# Patient Record
Sex: MALE | Race: WHITE | NOT HISPANIC OR LATINO | Employment: STUDENT | URBAN - METROPOLITAN AREA
[De-identification: names, ages, dates, MRNs, and addresses within clinical notes are randomized per-mention and may not be internally consistent; named-entity substitution may affect disease eponyms.]

---

## 2021-11-04 ENCOUNTER — HOSPITAL ENCOUNTER (EMERGENCY)
Facility: HOSPITAL | Age: 21
Discharge: HOME | End: 2021-11-04
Attending: EMERGENCY MEDICINE
Payer: COMMERCIAL

## 2021-11-04 ENCOUNTER — APPOINTMENT (EMERGENCY)
Dept: CT IMAGING | Facility: HOSPITAL | Age: 21
End: 2021-11-04
Payer: COMMERCIAL

## 2021-11-04 VITALS
WEIGHT: 183 LBS | HEART RATE: 100 BPM | DIASTOLIC BLOOD PRESSURE: 74 MMHG | SYSTOLIC BLOOD PRESSURE: 116 MMHG | RESPIRATION RATE: 16 BRPM | TEMPERATURE: 98.4 F | OXYGEN SATURATION: 100 %

## 2021-11-04 DIAGNOSIS — S09.90XA INJURY OF HEAD, INITIAL ENCOUNTER: ICD-10-CM

## 2021-11-04 DIAGNOSIS — R55 VASOVAGAL SYNCOPE: Primary | ICD-10-CM

## 2021-11-04 LAB
ANION GAP SERPL CALCULATED.3IONS-SCNC: 10 MMOL/L (ref 4–13)
APTT PPP: 33 SECONDS (ref 23–37)
ATRIAL RATE: 81 BPM
BASOPHILS # BLD AUTO: 0.08 THOUSANDS/ΜL (ref 0–0.1)
BASOPHILS NFR BLD AUTO: 1 % (ref 0–1)
BUN SERPL-MCNC: 10 MG/DL (ref 5–25)
CALCIUM SERPL-MCNC: 9.2 MG/DL (ref 8.3–10.1)
CHLORIDE SERPL-SCNC: 102 MMOL/L (ref 100–108)
CO2 SERPL-SCNC: 26 MMOL/L (ref 21–32)
CREAT SERPL-MCNC: 0.76 MG/DL (ref 0.6–1.3)
EOSINOPHIL # BLD AUTO: 0.19 THOUSAND/ΜL (ref 0–0.61)
EOSINOPHIL NFR BLD AUTO: 2 % (ref 0–6)
ERYTHROCYTE [DISTWIDTH] IN BLOOD BY AUTOMATED COUNT: 13.9 % (ref 11.6–15.1)
GFR SERPL CREATININE-BSD FRML MDRD: 131 ML/MIN/1.73SQ M
GLUCOSE SERPL-MCNC: 93 MG/DL (ref 65–140)
HCT VFR BLD AUTO: 47.3 % (ref 36.5–49.3)
HGB BLD-MCNC: 15.8 G/DL (ref 12–17)
IMM GRANULOCYTES # BLD AUTO: 0.06 THOUSAND/UL (ref 0–0.2)
IMM GRANULOCYTES NFR BLD AUTO: 1 % (ref 0–2)
INR PPP: 1.16 (ref 0.84–1.19)
LYMPHOCYTES # BLD AUTO: 2.72 THOUSANDS/ΜL (ref 0.6–4.47)
LYMPHOCYTES NFR BLD AUTO: 24 % (ref 14–44)
MCH RBC QN AUTO: 28.4 PG (ref 26.8–34.3)
MCHC RBC AUTO-ENTMCNC: 33.4 G/DL (ref 31.4–37.4)
MCV RBC AUTO: 85 FL (ref 82–98)
MONOCYTES # BLD AUTO: 0.73 THOUSAND/ΜL (ref 0.17–1.22)
MONOCYTES NFR BLD AUTO: 6 % (ref 4–12)
NEUTROPHILS # BLD AUTO: 7.73 THOUSANDS/ΜL (ref 1.85–7.62)
NEUTS SEG NFR BLD AUTO: 66 % (ref 43–75)
NRBC BLD AUTO-RTO: 0 /100 WBCS
P AXIS: 67 DEGREES
PLATELET # BLD AUTO: 365 THOUSANDS/UL (ref 149–390)
PMV BLD AUTO: 10.7 FL (ref 8.9–12.7)
POTASSIUM SERPL-SCNC: 3.9 MMOL/L (ref 3.5–5.3)
PR INTERVAL: 148 MS
PROTHROMBIN TIME: 14.4 SECONDS (ref 11.6–14.5)
QRS AXIS: 50 DEGREES
QRSD INTERVAL: 92 MS
QT INTERVAL: 354 MS
QTC INTERVAL: 411 MS
RBC # BLD AUTO: 5.56 MILLION/UL (ref 3.88–5.62)
SODIUM SERPL-SCNC: 138 MMOL/L (ref 136–145)
T WAVE AXIS: 54 DEGREES
TROPONIN I SERPL-MCNC: <0.02 NG/ML
VENTRICULAR RATE: 81 BPM
WBC # BLD AUTO: 11.51 THOUSAND/UL (ref 4.31–10.16)

## 2021-11-04 PROCEDURE — 70450 CT HEAD/BRAIN W/O DYE: CPT

## 2021-11-04 PROCEDURE — 85730 THROMBOPLASTIN TIME PARTIAL: CPT | Performed by: EMERGENCY MEDICINE

## 2021-11-04 PROCEDURE — 85610 PROTHROMBIN TIME: CPT | Performed by: EMERGENCY MEDICINE

## 2021-11-04 PROCEDURE — 36415 COLL VENOUS BLD VENIPUNCTURE: CPT | Performed by: EMERGENCY MEDICINE

## 2021-11-04 PROCEDURE — 80048 BASIC METABOLIC PNL TOTAL CA: CPT | Performed by: EMERGENCY MEDICINE

## 2021-11-04 PROCEDURE — 71250 CT THORAX DX C-: CPT

## 2021-11-04 PROCEDURE — 99285 EMERGENCY DEPT VISIT HI MDM: CPT | Performed by: EMERGENCY MEDICINE

## 2021-11-04 PROCEDURE — 84484 ASSAY OF TROPONIN QUANT: CPT | Performed by: EMERGENCY MEDICINE

## 2021-11-04 PROCEDURE — 96360 HYDRATION IV INFUSION INIT: CPT

## 2021-11-04 PROCEDURE — 93010 ELECTROCARDIOGRAM REPORT: CPT | Performed by: INTERNAL MEDICINE

## 2021-11-04 PROCEDURE — 96361 HYDRATE IV INFUSION ADD-ON: CPT

## 2021-11-04 PROCEDURE — 93005 ELECTROCARDIOGRAM TRACING: CPT

## 2021-11-04 PROCEDURE — 85025 COMPLETE CBC W/AUTO DIFF WBC: CPT | Performed by: EMERGENCY MEDICINE

## 2021-11-04 PROCEDURE — 99285 EMERGENCY DEPT VISIT HI MDM: CPT

## 2021-11-04 RX ADMIN — SODIUM CHLORIDE 1000 ML: 0.9 INJECTION, SOLUTION INTRAVENOUS at 19:49

## 2023-04-21 ENCOUNTER — HOSPITAL ENCOUNTER (EMERGENCY)
Facility: HOSPITAL | Age: 23
Discharge: HOME | End: 2023-04-21
Attending: EMERGENCY MEDICINE

## 2023-04-21 VITALS
OXYGEN SATURATION: 98 % | TEMPERATURE: 99 F | WEIGHT: 227.07 LBS | DIASTOLIC BLOOD PRESSURE: 66 MMHG | SYSTOLIC BLOOD PRESSURE: 137 MMHG | RESPIRATION RATE: 16 BRPM | HEART RATE: 88 BPM

## 2023-04-21 DIAGNOSIS — F41.9 ANXIETY: Primary | ICD-10-CM

## 2023-04-21 LAB
ALBUMIN SERPL BCP-MCNC: 4 G/DL (ref 3.5–5)
ALP SERPL-CCNC: 101 U/L (ref 34–104)
ALT SERPL W P-5'-P-CCNC: 36 U/L (ref 7–52)
ANION GAP SERPL CALCULATED.3IONS-SCNC: 5 MMOL/L (ref 4–13)
AST SERPL W P-5'-P-CCNC: 24 U/L (ref 13–39)
BASOPHILS # BLD AUTO: 0.11 THOUSANDS/ΜL (ref 0–0.1)
BASOPHILS NFR BLD AUTO: 1 % (ref 0–1)
BILIRUB SERPL-MCNC: 0.38 MG/DL (ref 0.2–1)
BILIRUB UR QL STRIP: NEGATIVE
BUN SERPL-MCNC: 12 MG/DL (ref 5–25)
CALCIUM SERPL-MCNC: 8.7 MG/DL (ref 8.4–10.2)
CHLORIDE SERPL-SCNC: 106 MMOL/L (ref 96–108)
CLARITY UR: CLEAR
CO2 SERPL-SCNC: 28 MMOL/L (ref 21–32)
COLOR UR: YELLOW
CREAT SERPL-MCNC: 0.8 MG/DL (ref 0.6–1.3)
EOSINOPHIL # BLD AUTO: 0.22 THOUSAND/ΜL (ref 0–0.61)
EOSINOPHIL NFR BLD AUTO: 1 % (ref 0–6)
ERYTHROCYTE [DISTWIDTH] IN BLOOD BY AUTOMATED COUNT: 13 % (ref 11.6–15.1)
GFR SERPL CREATININE-BSD FRML MDRD: 126 ML/MIN/1.73SQ M
GLUCOSE SERPL-MCNC: 95 MG/DL (ref 65–140)
GLUCOSE UR STRIP-MCNC: NEGATIVE MG/DL
HCT VFR BLD AUTO: 43.8 % (ref 36.5–49.3)
HGB BLD-MCNC: 14.7 G/DL (ref 12–17)
HGB UR QL STRIP.AUTO: NEGATIVE
IMM GRANULOCYTES # BLD AUTO: 0.15 THOUSAND/UL (ref 0–0.2)
IMM GRANULOCYTES NFR BLD AUTO: 1 % (ref 0–2)
KETONES UR STRIP-MCNC: NEGATIVE MG/DL
LEUKOCYTE ESTERASE UR QL STRIP: NEGATIVE
LITHIUM SERPL-SCNC: 0.7 MMOL/L (ref 0.6–1.2)
LYMPHOCYTES # BLD AUTO: 2.05 THOUSANDS/ΜL (ref 0.6–4.47)
LYMPHOCYTES NFR BLD AUTO: 14 % (ref 14–44)
MAGNESIUM SERPL-MCNC: 2.1 MG/DL (ref 1.9–2.7)
MCH RBC QN AUTO: 29.5 PG (ref 26.8–34.3)
MCHC RBC AUTO-ENTMCNC: 33.6 G/DL (ref 31.4–37.4)
MCV RBC AUTO: 88 FL (ref 82–98)
MONOCYTES # BLD AUTO: 0.98 THOUSAND/ΜL (ref 0.17–1.22)
MONOCYTES NFR BLD AUTO: 6 % (ref 4–12)
NEUTROPHILS # BLD AUTO: 11.69 THOUSANDS/ΜL (ref 1.85–7.62)
NEUTS SEG NFR BLD AUTO: 77 % (ref 43–75)
NITRITE UR QL STRIP: NEGATIVE
NRBC BLD AUTO-RTO: 0 /100 WBCS
PH UR STRIP.AUTO: 7 [PH] (ref 4.5–8)
PHOSPHATE SERPL-MCNC: 4.5 MG/DL (ref 2.7–4.5)
PLATELET # BLD AUTO: 271 THOUSANDS/UL (ref 149–390)
PMV BLD AUTO: 9.7 FL (ref 8.9–12.7)
POTASSIUM SERPL-SCNC: 4.1 MMOL/L (ref 3.5–5.3)
PROT SERPL-MCNC: 6.2 G/DL (ref 6.4–8.4)
PROT UR STRIP-MCNC: NEGATIVE MG/DL
RBC # BLD AUTO: 4.99 MILLION/UL (ref 3.88–5.62)
SODIUM SERPL-SCNC: 139 MMOL/L (ref 135–147)
SP GR UR STRIP.AUTO: 1.02 (ref 1–1.03)
UROBILINOGEN UR QL STRIP.AUTO: 0.2 E.U./DL
WBC # BLD AUTO: 15.2 THOUSAND/UL (ref 4.31–10.16)

## 2023-04-21 RX ADMIN — SODIUM CHLORIDE 1000 ML: 0.9 INJECTION, SOLUTION INTRAVENOUS at 14:26

## 2023-04-21 NOTE — ED PROVIDER NOTES
Pt Name: Kathryne Eisenmenger  MRN: [id number]  [place] 2000  Age/Sex: 26 y o  male  Date of evaluation: 4/21/2023  PCP: No primary care provider on file  CHIEF COMPLAINT    Chief Complaint   Patient presents with   • Medication Problem     Pt reports concern for Lithium toxicity; reports feeling dehydrated and 'out of it'  PCP would like STAT lithium levels ordered  Pt has been on medication for roughly 2 months with no increase in dosage  HPI    Luz Thornton presents to the Emergency Department complaining of feeling unwell  He thinks that he is dehydrated and is concerned that his lithium level may be too high  He has been compliant with prescribed meds  He spoke with his psychiatrist and she told him to come to the ER to be evaluated  HPI      Past Medical and Surgical History    Past Medical History:   Diagnosis Date   • Anxiety        Past Surgical History:   Procedure Laterality Date   • GASTROPLASTY DUODENAL SWITCH         History reviewed  No pertinent family history  Social History     Tobacco Use   • Smoking status: Never   • Smokeless tobacco: Never   Substance Use Topics   • Alcohol use: Not Currently   • Drug use: Not Currently           Allergies    No Known Allergies    Home Medications    Prior to Admission medications    Not on File           Review of Systems    Review of Systems   Constitutional: Positive for fatigue  Negative for chills and fever  HENT: Negative for ear pain and sore throat  Eyes: Negative for pain and visual disturbance  Respiratory: Negative for cough and shortness of breath  Cardiovascular: Negative for chest pain and palpitations  Gastrointestinal: Negative for abdominal pain and vomiting  Genitourinary: Negative for dysuria and hematuria  Musculoskeletal: Negative for arthralgias and back pain  Skin: Negative for color change and rash  Neurological: Negative for seizures and syncope     All other systems reviewed and are negative  Physical Exam      ED Triage Vitals [04/21/23 1246]   Temperature Pulse Respirations Blood Pressure SpO2   99 °F (37 2 °C) 89 18 137/66 97 %      Temp Source Heart Rate Source Patient Position - Orthostatic VS BP Location FiO2 (%)   Oral Monitor Sitting Right arm --      Pain Score       --               Physical Exam  Vitals and nursing note reviewed  Constitutional:       General: He is not in acute distress  Appearance: He is well-developed  He is not diaphoretic  HENT:      Head: Normocephalic and atraumatic  Nose: Nose normal    Eyes:      Conjunctiva/sclera: Conjunctivae normal       Pupils: Pupils are equal, round, and reactive to light  Cardiovascular:      Rate and Rhythm: Normal rate and regular rhythm  Heart sounds: Normal heart sounds  No murmur heard  No friction rub  No gallop  Pulmonary:      Effort: Pulmonary effort is normal  No respiratory distress  Breath sounds: Normal breath sounds  No wheezing or rales  Abdominal:      General: Bowel sounds are normal       Palpations: Abdomen is soft  Tenderness: There is no abdominal tenderness  There is no guarding or rebound  Musculoskeletal:         General: Normal range of motion  Cervical back: Normal range of motion and neck supple  Skin:     General: Skin is warm and dry  Neurological:      Mental Status: He is alert and oriented to person, place, and time  Psychiatric:         Behavior: Behavior normal          Assessment and Plan    Jordyn Pa is a 26 y o  male who presents with thirst and fatigue  Physical examination unremarkable  Plan will be to perform diagnostic testing and treat symptomatically        MDM    Diagnostic Results      Labs:    Results for orders placed or performed during the hospital encounter of 04/21/23   CBC and differential   Result Value Ref Range    WBC 15 20 (H) 4 31 - 10 16 Thousand/uL    RBC 4 99 3 88 - 5 62 Million/uL    Hemoglobin 14 7 12 0 - 17 0 g/dL    Hematocrit 43 8 36 5 - 49 3 %    MCV 88 82 - 98 fL    MCH 29 5 26 8 - 34 3 pg    MCHC 33 6 31 4 - 37 4 g/dL    RDW 13 0 11 6 - 15 1 %    MPV 9 7 8 9 - 12 7 fL    Platelets 199 125 - 189 Thousands/uL    nRBC 0 /100 WBCs    Neutrophils Relative 77 (H) 43 - 75 %    Immat GRANS % 1 0 - 2 %    Lymphocytes Relative 14 14 - 44 %    Monocytes Relative 6 4 - 12 %    Eosinophils Relative 1 0 - 6 %    Basophils Relative 1 0 - 1 %    Neutrophils Absolute 11 69 (H) 1 85 - 7 62 Thousands/µL    Immature Grans Absolute 0 15 0 00 - 0 20 Thousand/uL    Lymphocytes Absolute 2 05 0 60 - 4 47 Thousands/µL    Monocytes Absolute 0 98 0 17 - 1 22 Thousand/µL    Eosinophils Absolute 0 22 0 00 - 0 61 Thousand/µL    Basophils Absolute 0 11 (H) 0 00 - 0 10 Thousands/µL   Comprehensive metabolic panel   Result Value Ref Range    Sodium 139 135 - 147 mmol/L    Potassium 4 1 3 5 - 5 3 mmol/L    Chloride 106 96 - 108 mmol/L    CO2 28 21 - 32 mmol/L    ANION GAP 5 4 - 13 mmol/L    BUN 12 5 - 25 mg/dL    Creatinine 0 80 0 60 - 1 30 mg/dL    Glucose 95 65 - 140 mg/dL    Calcium 8 7 8 4 - 10 2 mg/dL    AST 24 13 - 39 U/L    ALT 36 7 - 52 U/L    Alkaline Phosphatase 101 34 - 104 U/L    Total Protein 6 2 (L) 6 4 - 8 4 g/dL    Albumin 4 0 3 5 - 5 0 g/dL    Total Bilirubin 0 38 0 20 - 1 00 mg/dL    eGFR 126 ml/min/1 73sq m   Lithium level   Result Value Ref Range    Lithium Lvl 0 7 0 6 - 1 2 mmol/L   Magnesium   Result Value Ref Range    Magnesium 2 1 1 9 - 2 7 mg/dL   Phosphorus   Result Value Ref Range    Phosphorus 4 5 2 7 - 4 5 mg/dL   Urine Macroscopic, POC   Result Value Ref Range    Color, UA Yellow     Clarity, UA Clear     pH, UA 7 0 4 5 - 8 0    Leukocytes, UA Negative Negative    Nitrite, UA Negative Negative    Protein, UA Negative Negative mg/dl    Glucose, UA Negative Negative mg/dl    Ketones, UA Negative Negative mg/dl    Urobilinogen, UA 0 2 0 2, 1 0 E U /dl E U /dl    Bilirubin, UA Negative Negative    Occult Blood, UA Negative Negative    Specific [place], UA 1 020 1 003 - 1 030       All labs reviewed and utilized in the medical decision making process    Radiology:    No orders to display       All radiology studies independently viewed by me and interpreted by the radiologist     Procedure    Procedures      ED Course of Care and Re-Assessments        Medications   sodium chloride 0 9 % bolus 1,000 mL (0 mL Intravenous Stopped 4/21/23 8475)           FINAL IMPRESSION    Final diagnoses:   Anxiety         DISPOSITION/PLAN    Time reflects when diagnosis was documented in both MDM as applicable and the Disposition within this note     Time User Action Codes Description Comment    4/21/2023  3:21 PM Moncho Frias Add [F41 9] Anxiety       ED Disposition     ED Disposition   Discharge    Condition   Stable    Date/Time   Fri Apr 21, 2023  3:21 PM    Comment   Mervat Fox discharge to home/self care  Follow-up Information    None           PATIENT REFERRED TO:    No follow-up provider specified  DISCHARGE MEDICATIONS:    There are no discharge medications for this patient  No discharge procedures on file           Jet Aguilar, [address] [place] [address],   04/21/23 2032

## 2023-04-21 NOTE — ED NOTES
Pt approached Registration to reports he now is feeling palpitations  Reassessment of VS in [address] completed        Chi Garcias RN  04/21/23 1502

## 2023-09-06 ENCOUNTER — HOSPITAL ENCOUNTER (EMERGENCY)
Facility: HOSPITAL | Age: 23
Discharge: HOME | End: 2023-09-07
Attending: EMERGENCY MEDICINE
Payer: COMMERCIAL

## 2023-09-06 ENCOUNTER — APPOINTMENT (EMERGENCY)
Dept: RADIOLOGY | Facility: HOSPITAL | Age: 23
End: 2023-09-06
Payer: COMMERCIAL

## 2023-09-06 VITALS
RESPIRATION RATE: 18 BRPM | SYSTOLIC BLOOD PRESSURE: 142 MMHG | HEART RATE: 94 BPM | OXYGEN SATURATION: 100 % | WEIGHT: 210 LBS | TEMPERATURE: 98.4 F | DIASTOLIC BLOOD PRESSURE: 67 MMHG

## 2023-09-06 DIAGNOSIS — S93.601A SPRAIN OF RIGHT FOOT, INITIAL ENCOUNTER: Primary | ICD-10-CM

## 2023-09-06 PROCEDURE — 99284 EMERGENCY DEPT VISIT MOD MDM: CPT | Performed by: EMERGENCY MEDICINE

## 2023-09-06 PROCEDURE — 73610 X-RAY EXAM OF ANKLE: CPT

## 2023-09-06 PROCEDURE — 73630 X-RAY EXAM OF FOOT: CPT

## 2023-09-06 PROCEDURE — 99283 EMERGENCY DEPT VISIT LOW MDM: CPT

## 2023-09-06 PROCEDURE — 96372 THER/PROPH/DIAG INJ SC/IM: CPT

## 2023-09-06 RX ORDER — KETOROLAC TROMETHAMINE 30 MG/ML
30 INJECTION, SOLUTION INTRAMUSCULAR; INTRAVENOUS ONCE
Status: COMPLETED | OUTPATIENT
Start: 2023-09-06 | End: 2023-09-06

## 2023-09-06 RX ADMIN — KETOROLAC TROMETHAMINE 30 MG: 30 INJECTION, SOLUTION INTRAMUSCULAR; INTRAVENOUS at 23:28

## 2023-09-06 NOTE — Clinical Note
Josesito Salmeron was seen and treated in our emergency department on 9/6/2023. No restrictions            Diagnosis:     Angeles Cousin  may return to school on return date. He may return on this date: 09/08/2023         If you have any questions or concerns, please don't hesitate to call.       Andre Davenport MD    ______________________________           _______________          _______________  Hospital Representative                              Date                                Time

## 2023-09-07 ENCOUNTER — OFFICE VISIT (OUTPATIENT)
Dept: PODIATRY | Facility: CLINIC | Age: 23
End: 2023-09-07
Payer: COMMERCIAL

## 2023-09-07 VITALS — WEIGHT: 210 LBS | HEIGHT: 72 IN | BODY MASS INDEX: 28.44 KG/M2

## 2023-09-07 DIAGNOSIS — M79.671 PAIN IN RIGHT FOOT: ICD-10-CM

## 2023-09-07 DIAGNOSIS — M77.9 TENDONITIS: ICD-10-CM

## 2023-09-07 DIAGNOSIS — S90.31XA CONTUSION OF RIGHT FOOT, INITIAL ENCOUNTER: Primary | ICD-10-CM

## 2023-09-07 PROCEDURE — 99204 OFFICE O/P NEW MOD 45 MIN: CPT | Performed by: PODIATRIST

## 2023-09-07 RX ORDER — QUETIAPINE 200 MG/1
200 TABLET, FILM COATED, EXTENDED RELEASE ORAL
COMMUNITY
Start: 2023-08-22

## 2023-09-07 RX ORDER — PANTOPRAZOLE SODIUM 20 MG/1
20 TABLET, DELAYED RELEASE ORAL DAILY
Qty: 4 TABLET | Refills: 0 | Status: SHIPPED | OUTPATIENT
Start: 2023-09-07 | End: 2023-09-16

## 2023-09-07 RX ORDER — PANTOPRAZOLE SODIUM 40 MG/1
40 TABLET, DELAYED RELEASE ORAL DAILY
COMMUNITY
Start: 2023-07-13

## 2023-09-07 RX ORDER — MELOXICAM 15 MG/1
15 TABLET ORAL DAILY
Qty: 30 TABLET | Refills: 1 | Status: SHIPPED | OUTPATIENT
Start: 2023-09-07

## 2023-09-07 RX ORDER — TRIAMCINOLONE ACETONIDE 0.1 %
PASTE (GRAM) DENTAL
COMMUNITY
Start: 2023-08-07

## 2023-09-07 RX ORDER — LORAZEPAM 0.5 MG/1
TABLET ORAL
COMMUNITY
Start: 2023-07-09

## 2023-09-07 RX ORDER — FLUVOXAMINE MALEATE 100 MG
TABLET ORAL
COMMUNITY
Start: 2023-09-03

## 2023-09-07 RX ORDER — KETOROLAC TROMETHAMINE 10 MG/1
10 TABLET, FILM COATED ORAL EVERY 6 HOURS PRN
Qty: 8 TABLET | Refills: 0 | Status: SHIPPED | OUTPATIENT
Start: 2023-09-07

## 2023-09-07 RX ORDER — CHLORHEXIDINE GLUCONATE 0.12 MG/ML
RINSE ORAL
COMMUNITY
Start: 2023-08-07

## 2023-09-07 RX ORDER — LITHIUM CARBONATE 600 MG/1
600 CAPSULE ORAL 3 TIMES DAILY
COMMUNITY
Start: 2023-08-22 | End: 2023-09-16

## 2023-09-07 NOTE — DISCHARGE INSTRUCTIONS
Take the Toradol every 6 hours for 2 days. Take the Protonix daily while you take the Toradol. The number for podiatry is included in these discharge instructions, call to be seen in the office for further evaluation and management.
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
## 2023-09-07 NOTE — ED PROVIDER NOTES
History  Chief Complaint   Patient presents with   • Foot Injury     Pt c/o right foot pain after kicking someones thigh during a kickboxing class. 27 YO male presents with pain in the Right foot. Patient states he had kickboxing class ~4 hours ago. Had no issues with class but, after arriving home, developed pain in the lateral aspect of the Right foot which has been aching, sharp. Patient has had difficulty with ambulation due to the pain. He denies other injuries. Pt denies CP/SOB/F/C/N/V/D/C, no dysuria, burning on urination or blood in urine. History provided by:  Patient   used: No        None       Past Medical History:   Diagnosis Date   • Anxiety        Past Surgical History:   Procedure Laterality Date   • GASTROPLASTY DUODENAL SWITCH         History reviewed. No pertinent family history. I have reviewed and agree with the history as documented. E-Cigarette/Vaping     E-Cigarette/Vaping Substances     Social History     Tobacco Use   • Smoking status: Never   • Smokeless tobacco: Never   Substance Use Topics   • Alcohol use: Not Currently   • Drug use: Not Currently       Review of Systems   Constitutional: Negative for fever. HENT: Negative for dental problem. Eyes: Negative for visual disturbance. Respiratory: Negative for shortness of breath. Cardiovascular: Negative for chest pain. Gastrointestinal: Negative for abdominal pain, nausea and vomiting. Genitourinary: Negative for dysuria and frequency. Musculoskeletal: Positive for arthralgias. Negative for neck pain and neck stiffness. Skin: Negative for rash. Neurological: Negative for dizziness, weakness and light-headedness. Psychiatric/Behavioral: Negative for agitation, behavioral problems and confusion. All other systems reviewed and are negative. Physical Exam  Physical Exam  Vitals and nursing note reviewed. Constitutional:       Appearance: He is well-developed.    HENT:      Head: Normocephalic and atraumatic. Eyes:      Extraocular Movements: Extraocular movements intact. Cardiovascular:      Rate and Rhythm: Normal rate. Pulmonary:      Effort: Pulmonary effort is normal.   Abdominal:      General: There is no distension. Musculoskeletal:      Cervical back: Normal range of motion. Comments: Tender to palpation over the lateral aspect of the Right foot. Skin:     Findings: No rash. Neurological:      Mental Status: He is alert and oriented to person, place, and time. Psychiatric:         Behavior: Behavior normal.         Vital Signs  ED Triage Vitals   Temperature Pulse Respirations Blood Pressure SpO2   09/06/23 2319 09/06/23 2313 09/06/23 2313 09/06/23 2319 09/06/23 2313   98.4 °F (36.9 °C) 94 18 142/67 100 %      Temp src Heart Rate Source Patient Position - Orthostatic VS BP Location FiO2 (%)   -- -- -- -- --             Pain Score       09/06/23 2313       10 - Worst Possible Pain           Vitals:    09/06/23 2313 09/06/23 2319   BP:  142/67   Pulse: 94          Visual Acuity      ED Medications  Medications   ketorolac (TORADOL) injection 30 mg (30 mg Intramuscular Given 9/6/23 2328)       Diagnostic Studies  Results Reviewed     None                 XR ankle 3+ views RIGHT   ED Interpretation by Andre Davenport MD (09/06 2348)   No fracture      XR foot 3+ views RIGHT   ED Interpretation by Andre Davenport MD (09/06 2349)   No fracture                   Procedures  Procedures     Splint application:  Posterior Splint was applied to Right leg, Applied by technician, good position, neurovascular tendon intact, good capillary refill. Evaluated by me prior to discharge. ED Course  ED Course as of 09/07/23 0042   Wed Sep 06, 2023   2349 X-ray(s) personally evaluated, interpretation: No fractures or dislocation. Thu Sep 07, 2023   0014 Patient unable to bear weight on foot, will have splint placed.                                SBIRT 20yo+    Flowsheet Row Most Recent Value   Initial Alcohol Screen: US AUDIT-C     1. How often do you have a drink containing alcohol? 2 Filed at: 09/06/2023 2315   2. How many drinks containing alcohol do you have on a typical day you are drinking? 2 Filed at: 09/06/2023 2315   3a. Male UNDER 65: How often do you have five or more drinks on one occasion? 0 Filed at: 09/06/2023 2315   Audit-C Score 4 Filed at: 09/06/2023 2315   ROSA: How many times in the past year have you. .. Used an illegal drug or used a prescription medication for non-medical reasons? Never Filed at: 09/06/2023 2315                    Medical Decision Making  1. Foot pain - Patient states no pain initially, likely sprain. Will x-ray foot and ankle to rule out fracture. Likely splint as patient is unable to ambulate. Amount and/or Complexity of Data Reviewed  Radiology: ordered and independent interpretation performed. Decision-making details documented in ED Course. Risk  Prescription drug management. Disposition  Final diagnoses:   Sprain of right foot, initial encounter     Time reflects when diagnosis was documented in both MDM as applicable and the Disposition within this note     Time User Action Codes Description Comment    9/7/2023 12:15 AM Gonzalez, [address] Sprain of right foot, initial encounter       ED Disposition     ED Disposition   Discharge    Condition   Stable    Date/Time   Thu Sep 7, 2023 12:15 AM    Comment   Lashaun Mckinney discharge to home/self care.                Follow-up Information     Follow up With Specialties Details Why Contact Info Additional Information    [place] [id number] [address]  [facility] [address]  [address] [place] [address] [address], [address] [phone]          Patient's Medications   Discharge Prescriptions    KETOROLAC (TORADOL) 10 MG TABLET    Take 1 tablet (10 mg total) by mouth every 6 (six) hours as needed for moderate pain for up to 8 doses       Start Date: 9/7/2023  End Date: --       Order Dose: 10 mg       Quantity: 8 tablet    Refills: 0    PANTOPRAZOLE (PROTONIX) 20 MG TABLET    Take 1 tablet (20 mg total) by mouth daily for 4 doses       Start Date: 9/7/2023  End Date: 9/11/2023       Order Dose: 20 mg       Quantity: 4 tablet    Refills: 0       No discharge procedures on file.     PDMP Review     None          ED Provider  Electronically Signed by           Prakash Alonso MD  09/07/23 7099

## 2023-09-09 ENCOUNTER — APPOINTMENT (EMERGENCY)
Dept: CT IMAGING | Facility: HOSPITAL | Age: 23
End: 2023-09-09
Payer: COMMERCIAL

## 2023-09-09 ENCOUNTER — HOSPITAL ENCOUNTER (EMERGENCY)
Facility: HOSPITAL | Age: 23
Discharge: HOME | End: 2023-09-09
Attending: EMERGENCY MEDICINE
Payer: COMMERCIAL

## 2023-09-09 VITALS
RESPIRATION RATE: 16 BRPM | WEIGHT: 199.96 LBS | BODY MASS INDEX: 27.12 KG/M2 | DIASTOLIC BLOOD PRESSURE: 55 MMHG | OXYGEN SATURATION: 99 % | TEMPERATURE: 97.9 F | SYSTOLIC BLOOD PRESSURE: 117 MMHG | HEART RATE: 88 BPM

## 2023-09-09 DIAGNOSIS — R10.9 ABDOMINAL PAIN: ICD-10-CM

## 2023-09-09 DIAGNOSIS — R11.2 NAUSEA AND VOMITING: Primary | ICD-10-CM

## 2023-09-09 DIAGNOSIS — K52.9 CHRONIC DIARRHEA: ICD-10-CM

## 2023-09-09 LAB
ALBUMIN SERPL BCP-MCNC: 4.1 G/DL (ref 3.5–5)
ALP SERPL-CCNC: 124 U/L (ref 34–104)
ALT SERPL W P-5'-P-CCNC: 22 U/L (ref 7–52)
ANION GAP SERPL CALCULATED.3IONS-SCNC: 6 MMOL/L
AST SERPL W P-5'-P-CCNC: 20 U/L (ref 13–39)
BASOPHILS # BLD AUTO: 0.05 THOUSANDS/ÂΜL (ref 0–0.1)
BASOPHILS NFR BLD AUTO: 1 % (ref 0–1)
BILIRUB SERPL-MCNC: 0.4 MG/DL (ref 0.2–1)
BUN SERPL-MCNC: 7 MG/DL (ref 5–25)
CALCIUM SERPL-MCNC: 9.1 MG/DL (ref 8.4–10.2)
CHLORIDE SERPL-SCNC: 102 MMOL/L (ref 96–108)
CO2 SERPL-SCNC: 25 MMOL/L (ref 21–32)
CREAT SERPL-MCNC: 0.88 MG/DL (ref 0.6–1.3)
EOSINOPHIL # BLD AUTO: 0.21 THOUSAND/ÂΜL (ref 0–0.61)
EOSINOPHIL NFR BLD AUTO: 2 % (ref 0–6)
ERYTHROCYTE [DISTWIDTH] IN BLOOD BY AUTOMATED COUNT: 14.3 % (ref 11.6–15.1)
GFR SERPL CREATININE-BSD FRML MDRD: 121 ML/MIN/1.73SQ M
GLUCOSE SERPL-MCNC: 83 MG/DL (ref 65–140)
HCT VFR BLD AUTO: 50.2 % (ref 36.5–49.3)
HGB BLD-MCNC: 16.5 G/DL (ref 12–17)
IMM GRANULOCYTES # BLD AUTO: 0.07 THOUSAND/UL (ref 0–0.2)
IMM GRANULOCYTES NFR BLD AUTO: 1 % (ref 0–2)
LIPASE SERPL-CCNC: 49 U/L (ref 11–82)
LYMPHOCYTES # BLD AUTO: 1.2 THOUSANDS/ÂΜL (ref 0.6–4.47)
LYMPHOCYTES NFR BLD AUTO: 11 % (ref 14–44)
MCH RBC QN AUTO: 28.6 PG (ref 26.8–34.3)
MCHC RBC AUTO-ENTMCNC: 32.9 G/DL (ref 31.4–37.4)
MCV RBC AUTO: 87 FL (ref 82–98)
MONOCYTES # BLD AUTO: 0.72 THOUSAND/ÂΜL (ref 0.17–1.22)
MONOCYTES NFR BLD AUTO: 7 % (ref 4–12)
NEUTROPHILS # BLD AUTO: 8.56 THOUSANDS/ÂΜL (ref 1.85–7.62)
NEUTS SEG NFR BLD AUTO: 78 % (ref 43–75)
NRBC BLD AUTO-RTO: 0 /100 WBCS
PLATELET # BLD AUTO: 312 THOUSANDS/UL (ref 149–390)
PMV BLD AUTO: 10.7 FL (ref 8.9–12.7)
POTASSIUM SERPL-SCNC: 3.8 MMOL/L (ref 3.5–5.3)
PROT SERPL-MCNC: 6.7 G/DL (ref 6.4–8.4)
RBC # BLD AUTO: 5.76 MILLION/UL (ref 3.88–5.62)
SODIUM SERPL-SCNC: 133 MMOL/L (ref 135–147)
WBC # BLD AUTO: 10.81 THOUSAND/UL (ref 4.31–10.16)

## 2023-09-09 PROCEDURE — G1004 CDSM NDSC: HCPCS

## 2023-09-09 PROCEDURE — 74177 CT ABD & PELVIS W/CONTRAST: CPT

## 2023-09-09 PROCEDURE — 85025 COMPLETE CBC W/AUTO DIFF WBC: CPT | Performed by: EMERGENCY MEDICINE

## 2023-09-09 PROCEDURE — 96375 TX/PRO/DX INJ NEW DRUG ADDON: CPT

## 2023-09-09 PROCEDURE — 36415 COLL VENOUS BLD VENIPUNCTURE: CPT | Performed by: EMERGENCY MEDICINE

## 2023-09-09 PROCEDURE — 99284 EMERGENCY DEPT VISIT MOD MDM: CPT

## 2023-09-09 PROCEDURE — 99285 EMERGENCY DEPT VISIT HI MDM: CPT | Performed by: EMERGENCY MEDICINE

## 2023-09-09 PROCEDURE — 96365 THER/PROPH/DIAG IV INF INIT: CPT

## 2023-09-09 PROCEDURE — 96372 THER/PROPH/DIAG INJ SC/IM: CPT

## 2023-09-09 PROCEDURE — 80053 COMPREHEN METABOLIC PANEL: CPT | Performed by: EMERGENCY MEDICINE

## 2023-09-09 PROCEDURE — 83690 ASSAY OF LIPASE: CPT | Performed by: EMERGENCY MEDICINE

## 2023-09-09 RX ORDER — DICYCLOMINE HCL 20 MG
20 TABLET ORAL EVERY 6 HOURS PRN
Qty: 20 TABLET | Refills: 0 | Status: SHIPPED | OUTPATIENT
Start: 2023-09-09

## 2023-09-09 RX ORDER — PROMETHAZINE HYDROCHLORIDE 25 MG/ML
12.5 INJECTION, SOLUTION INTRAMUSCULAR; INTRAVENOUS ONCE
Status: COMPLETED | OUTPATIENT
Start: 2023-09-09 | End: 2023-09-09

## 2023-09-09 RX ORDER — PROMETHAZINE HYDROCHLORIDE 25 MG/1
25 TABLET ORAL EVERY 6 HOURS PRN
Qty: 30 TABLET | Refills: 0 | Status: SHIPPED | OUTPATIENT
Start: 2023-09-09

## 2023-09-09 RX ORDER — ONDANSETRON 2 MG/ML
8 INJECTION INTRAMUSCULAR; INTRAVENOUS ONCE
Status: COMPLETED | OUTPATIENT
Start: 2023-09-09 | End: 2023-09-09

## 2023-09-09 RX ORDER — FAMOTIDINE 10 MG/ML
20 INJECTION, SOLUTION INTRAVENOUS ONCE
Status: COMPLETED | OUTPATIENT
Start: 2023-09-09 | End: 2023-09-09

## 2023-09-09 RX ADMIN — PROMETHAZINE HYDROCHLORIDE 12.5 MG: 25 INJECTION INTRAMUSCULAR; INTRAVENOUS at 19:38

## 2023-09-09 RX ADMIN — IOHEXOL 50 ML: 240 INJECTION, SOLUTION INTRATHECAL; INTRAVASCULAR; INTRAVENOUS; ORAL at 18:55

## 2023-09-09 RX ADMIN — ONDANSETRON 8 MG: 2 INJECTION INTRAMUSCULAR; INTRAVENOUS at 17:09

## 2023-09-09 RX ADMIN — SODIUM CHLORIDE, SODIUM LACTATE, POTASSIUM CHLORIDE, AND CALCIUM CHLORIDE 1000 ML: .6; .31; .03; .02 INJECTION, SOLUTION INTRAVENOUS at 17:05

## 2023-09-09 RX ADMIN — FAMOTIDINE 20 MG: 10 INJECTION INTRAVENOUS at 17:09

## 2023-09-09 RX ADMIN — IOHEXOL 100 ML: 350 INJECTION, SOLUTION INTRAVENOUS at 18:55

## 2023-09-09 NOTE — ED PROVIDER NOTES
History  Chief Complaint   Patient presents with   • Vomiting     X1 week with vomiting, diarrhea longer then 1 week started shortly after dose change of ozampic      23y M here for evaluation of persistent n/v for the last week and unable to tolerate even sips of water for the last couple of days. Pt also reports loose stools, but states that has been an ongoing problems "for a long time". Pt deneis f/c/s, no cough/congestion. Taking ODT zofran at home w/o improvement. Pt w/ hx of bariatric surgery (gastroplasty duodenal switch) done by bariatric surgeon in [place] approx 3y ago. About a month ago, went online and was prescribed ozempic for additional weight loss - not through his regular doctor but some online program that he filled out a questionnaire and got the prescription through the mail. The n/v seemed to start after he increased the ozempic dose this week. When asked why he wanted to start ozempic b/c his BMI is actually 28, pt states "bad decisions". History provided by:  Patient   used: No        Prior to Admission Medications   Prescriptions Last Dose Informant Patient Reported? Taking?    LORazepam (ATIVAN) 0.5 mg tablet   Yes No   Sig: TAKE 1 TABLET BY MOUTH ONCE DAILY AS NEEDED FOR ANXIETY   Patient not taking: Reported on 9/7/2023   QUEtiapine (SEROquel XR) 200 mg 24 hr tablet   Yes No   Sig: Take 200 mg by mouth daily at bedtime   chlorhexidine (PERIDEX) 0.12 % solution   Yes No   Sig: RINSE AND SPIT FOR THIRTY SECONDS TWICE DAILY FOR 2 WEEKS   Patient not taking: Reported on 9/7/2023   fluvoxaMINE (LUVOX) 100 mg tablet   Yes No   Sig: TAKE 1 AND A HALF TABLETS BY MOUTH TWICE DAILY   ketorolac (TORADOL) 10 mg tablet   No No   Sig: Take 1 tablet (10 mg total) by mouth every 6 (six) hours as needed for moderate pain for up to 8 doses   lithium 600 MG capsule   Yes No   Sig: Take 600 mg by mouth 3 (three) times a day   meloxicam (Mobic) 15 mg tablet   No No   Sig: Take 1 tablet (15 mg total) by mouth daily   pantoprazole (PROTONIX) 20 mg tablet   No No   Sig: Take 1 tablet (20 mg total) by mouth daily for 4 doses   pantoprazole (PROTONIX) 40 mg tablet   Yes No   Sig: Take 40 mg by mouth daily   Patient not taking: Reported on 9/7/2023   triamcinolone (KENALOG) 0.1 % oral topical paste   Yes No   Sig: APPLY THIN FILM ON AFFECTED AREA 3-4 TIMES A DAY AFTER EATING AND AT BEDTIME   Patient not taking: Reported on 9/7/2023      Facility-Administered Medications: None       Past Medical History:   Diagnosis Date   • Anxiety        Past Surgical History:   Procedure Laterality Date   • CHOLECYSTECTOMY     • GASTROPLASTY DUODENAL SWITCH         History reviewed. No pertinent family history. I have reviewed and agree with the history as documented. E-Cigarette/Vaping   • E-Cigarette Use Never User      E-Cigarette/Vaping Substances   • Nicotine No    • THC No    • CBD No    • Flavoring No      Social History     Tobacco Use   • Smoking status: Never   • Smokeless tobacco: Never   Vaping Use   • Vaping Use: Never used   Substance Use Topics   • Alcohol use: Not Currently     Comment: occasional   • Drug use: Not Currently       Review of Systems   All other systems reviewed and are negative. Physical Exam  Physical Exam  Vitals and nursing note reviewed. Constitutional:       Appearance: Normal appearance. HENT:      Nose: Nose normal.   Eyes:      Conjunctiva/sclera: Conjunctivae normal.   Cardiovascular:      Rate and Rhythm: Normal rate. Pulmonary:      Effort: Pulmonary effort is normal.   Abdominal:      General: Abdomen is flat. Palpations: Abdomen is soft. Tenderness: There is abdominal tenderness in the epigastric area. There is no guarding or rebound. Musculoskeletal:         General: No tenderness. Cervical back: Normal range of motion. Skin:     General: Skin is warm. Capillary Refill: Capillary refill takes less than 2 seconds. Neurological:      General: No focal deficit present. Mental Status: He is alert.    Psychiatric:         Mood and Affect: Mood normal.         Vital Signs  ED Triage Vitals [09/09/23 1617]   Temperature Pulse Respirations Blood Pressure SpO2   97.9 °F (36.6 °C) 96 18 165/73 100 %      Temp Source Heart Rate Source Patient Position - Orthostatic VS BP Location FiO2 (%)   Oral Monitor Sitting Right arm --      Pain Score       4           Vitals:    09/09/23 1617 09/09/23 1942   BP: 165/73 117/55   Pulse: 96 88   Patient Position - Orthostatic VS: Sitting Lying         Visual Acuity      ED Medications  Medications   ondansetron (ZOFRAN) injection 8 mg (8 mg Intravenous Given 9/9/23 1709)   Famotidine (PF) (PEPCID) injection 20 mg (20 mg Intravenous Given 9/9/23 1709)   lactated ringers bolus 1,000 mL (0 mL Intravenous Stopped 9/9/23 1813)   iohexol (OMNIPAQUE) 350 MG/ML injection (MULTI-DOSE) 100 mL (100 mL Intravenous Given 9/9/23 1855)   iohexol (OMNIPAQUE) 240 MG/ML solution 50 mL (50 mL Oral Given 9/9/23 1855)   promethazine (PHENERGAN) injection 12.5 mg (12.5 mg Intramuscular Given 9/9/23 1938)       Diagnostic Studies  Results Reviewed     Procedure Component Value Units Date/Time    Comprehensive metabolic panel [[id number]]  (Abnormal) Collected: 09/09/23 1704    Lab Status: Final result Specimen: Blood from Arm, Left Updated: 09/09/23 1732     Sodium 133 mmol/L      Potassium 3.8 mmol/L      Chloride 102 mmol/L      CO2 25 mmol/L      ANION GAP 6 mmol/L      BUN 7 mg/dL      Creatinine 0.88 mg/dL      Glucose 83 mg/dL      Calcium 9.1 mg/dL      AST 20 U/L      ALT 22 U/L      Alkaline Phosphatase 124 U/L      Total Protein 6.7 g/dL      Albumin 4.1 g/dL      Total Bilirubin 0.40 mg/dL      eGFR 121 ml/min/1.73sq m     Narrative:      [place] guidelines for Chronic Kidney Disease (CKD):   •  Stage 1 with normal or high GFR (GFR > 90 mL/min/1.73 square meters)  •  Stage 2 Mild CKD (GFR = 60-89 mL/min/1.73 square meters)  •  Stage 3A Moderate CKD (GFR = 45-59 mL/min/1.73 square meters)  •  Stage 3B Moderate CKD (GFR = 30-44 mL/min/1.73 square meters)  •  Stage 4 Severe CKD (GFR = 15-29 mL/min/1.73 square meters)  •  Stage 5 End Stage CKD (GFR <15 mL/min/1.73 square meters)  Note: GFR calculation is accurate only with a steady state creatinine    Lipase [[id number]]  (Normal) Collected: 09/09/23 1704    Lab Status: Final result Specimen: Blood from Arm, Left Updated: 09/09/23 1732     Lipase 49 u/L     CBC and differential [[id number]]  (Abnormal) Collected: 09/09/23 1704    Lab Status: Final result Specimen: Blood from Arm, Left Updated: 09/09/23 1718     WBC 10.81 Thousand/uL      RBC 5.76 Million/uL      Hemoglobin 16.5 g/dL      Hematocrit 50.2 %      MCV 87 fL      MCH 28.6 pg      MCHC 32.9 g/dL      RDW 14.3 %      MPV 10.7 fL      Platelets 838 Thousands/uL      nRBC 0 /100 WBCs      Neutrophils Relative 78 %      Immat GRANS % 1 %      Lymphocytes Relative 11 %      Monocytes Relative 7 %      Eosinophils Relative 2 %      Basophils Relative 1 %      Neutrophils Absolute 8.56 Thousands/µL      Immature Grans Absolute 0.07 Thousand/uL      Lymphocytes Absolute 1.20 Thousands/µL      Monocytes Absolute 0.72 Thousand/µL      Eosinophils Absolute 0.21 Thousand/µL      Basophils Absolute 0.05 Thousands/µL                  CT abdomen pelvis with contrast   ED Interpretation by Greg Lawrence DO (09/09 2025)   See below      Final Result by Mil Adrian MD (09/09 2020)      Fluid-filled loops of small and large bowel, likely diarrheal in etiology. Postsurgical changes of gastroplasty duodenal switch. Mild splenomegaly. Focal fatty infiltration in the medial aspect lobe adjacent to the fissure for the falciform ligament.             Workstation performed: [id number]                    Procedures  Procedures         ED Course  ED Course as of 09/09/23 2056   Sat Sep 09, 2023   1650 Started drinking contrast                                 SBIRT 22yo+    Flowsheet Row Most Recent Value   Initial Alcohol Screen: US AUDIT-C     1. How often do you have a drink containing alcohol? 2 Filed at: 09/09/2023 1717   2. How many drinks containing alcohol do you have on a typical day you are drinking? 0 Filed at: 09/09/2023 1717   3a. Male UNDER 65: How often do you have five or more drinks on one occasion? 0 Filed at: 09/09/2023 1717   Audit-C Score 2 Filed at: 09/09/2023 1717   ROSA: How many times in the past year have you. .. Used an illegal drug or used a prescription medication for non-medical reasons? Never Filed at: 09/09/2023 1717                    Medical Decision Making  Will get labs to r/o acute life threatening metabolic abnl, significant leukocytosis or anemia. Will get CT a/p to r/o colitis, enteritis, appendicitis, or other acute life threatening intra-abdominal process    Abdominal pain: acute illness or injury that poses a threat to life or bodily functions  Chronic diarrhea: chronic illness or injury with exacerbation, progression, or side effects of treatment  Nausea and vomiting: acute illness or injury that poses a threat to life or bodily functions  Amount and/or Complexity of Data Reviewed  External Data Reviewed: notes. Details: outpt bariatrics notes reviewed  Labs: ordered. Radiology: ordered. Risk  Prescription drug management.           Disposition  Final diagnoses:   Nausea and vomiting   Abdominal pain   Chronic diarrhea     Time reflects when diagnosis was documented in both MDM as applicable and the Disposition within this note     Time User Action Codes Description Comment    9/9/2023  8:29 PM Teresa Kwok Add [R11.2] Nausea and vomiting     9/9/2023  8:29 PM Teresa Kwok Add [R10.9] Abdominal pain     9/9/2023  8:29 PM Teresa Kwok [K52.9] Chronic diarrhea       ED Disposition     ED Disposition   Discharge    Condition Stable    Date/Time   Sat Sep 9, 2023  8:29 PM    Comment   Natasha Salvador discharge to home/self care. Follow-up Information     Follow up With Specialties Details Why Contact Info Additional [address] [facility]  As needed [address]  [place] [address] [facility],[address] [id number] [address] [facility], [address] [address] [address], [facility], [place], [address] [phone]          Discharge Medication List as of 9/9/2023  8:41 PM      START taking these medications    Details   dicyclomine (BENTYL) 20 mg tablet Take 1 tablet (20 mg total) by mouth every 6 (six) hours as needed (diarrhea/crampy abdominal pain), Starting Sat 9/9/2023, Normal      promethazine (PHENERGAN) 25 mg tablet Take 1 tablet (25 mg total) by mouth every 6 (six) hours as needed for nausea or vomiting, Starting Sat 9/9/2023, Normal         CONTINUE these medications which have NOT CHANGED    Details   chlorhexidine (PERIDEX) 0.12 % solution RINSE AND SPIT FOR THIRTY SECONDS TWICE DAILY FOR 2 WEEKS, Historical Med      fluvoxaMINE (LUVOX) 100 mg tablet TAKE 1 AND A HALF TABLETS BY MOUTH TWICE DAILY, Historical Med      ketorolac (TORADOL) 10 mg tablet Take 1 tablet (10 mg total) by mouth every 6 (six) hours as needed for moderate pain for up to 8 doses, Starting Thu 9/7/2023, Print      lithium 600 MG capsule Take 600 mg by mouth 3 (three) times a day, Starting Tue 8/22/2023, Historical Med      LORazepam (ATIVAN) 0.5 mg tablet TAKE 1 TABLET BY MOUTH ONCE DAILY AS NEEDED FOR ANXIETY, Historical Med      meloxicam (Mobic) 15 mg tablet Take 1 tablet (15 mg total) by mouth daily, Starting Thu 9/7/2023, Normal      !!  pantoprazole (PROTONIX) 20 mg tablet Take 1 tablet (20 mg total) by mouth daily for 4 doses, Starting Thu 9/7/2023, Until Mon 9/11/2023, Print      !! pantoprazole (PROTONIX) 40 mg tablet Take 40 mg by mouth daily, Starting Thu 7/13/2023, Historical Med      QUEtiapine (SEROquel XR) 200 mg 24 hr tablet Take 200 mg by mouth daily at bedtime, Starting Tue 8/22/2023, Historical Med      triamcinolone (KENALOG) 0.1 % oral topical paste APPLY THIN FILM ON AFFECTED AREA 3-4 TIMES A DAY AFTER EATING AND AT BEDTIME, Historical Med       !! - Potential duplicate medications found. Please discuss with provider. No discharge procedures on file.     PDMP Review     None          ED Provider  Electronically Signed by           Greg Lawrence DO  09/09/23 2056

## 2023-09-10 NOTE — DISCHARGE INSTRUCTIONS
Clear liquids only for the next 6-8 hours. If no recurrent vomiting or severe nausea, you may advance to a bland diet and slowly advance as tolerated. Return to the Emergency Department for any persistent vomiting, any blood in your vomit or stools, worsening pain, fever more than 101, or for any concerns.

## 2023-09-15 ENCOUNTER — HOSPITAL ENCOUNTER (OUTPATIENT)
Facility: HOSPITAL | Age: 23
Setting detail: OBSERVATION
Discharge: HOME | End: 2023-09-16
Attending: EMERGENCY MEDICINE | Admitting: INTERNAL MEDICINE
Payer: COMMERCIAL

## 2023-09-15 DIAGNOSIS — F41.9 ANXIETY: ICD-10-CM

## 2023-09-15 DIAGNOSIS — D72.829 LEUKOCYTOSIS: ICD-10-CM

## 2023-09-15 DIAGNOSIS — T56.891A LITHIUM TOXICITY: Primary | ICD-10-CM

## 2023-09-15 PROBLEM — Z90.3 HISTORY OF SLEEVE GASTRECTOMY: Chronic | Status: ACTIVE | Noted: 2023-09-15

## 2023-09-15 PROBLEM — F32.9 MAJOR DEPRESSIVE DISORDER: Chronic | Status: ACTIVE | Noted: 2023-09-15

## 2023-09-15 LAB
ALBUMIN SERPL BCP-MCNC: 4.7 G/DL (ref 3.5–5)
ALP SERPL-CCNC: 163 U/L (ref 34–104)
ALT SERPL W P-5'-P-CCNC: 49 U/L (ref 7–52)
ANION GAP SERPL CALCULATED.3IONS-SCNC: 4 MMOL/L
AST SERPL W P-5'-P-CCNC: 30 U/L (ref 13–39)
BASOPHILS # BLD AUTO: 0.11 THOUSANDS/ÂΜL (ref 0–0.1)
BASOPHILS NFR BLD AUTO: 1 % (ref 0–1)
BILIRUB SERPL-MCNC: 0.38 MG/DL (ref 0.2–1)
BUN SERPL-MCNC: 6 MG/DL (ref 5–25)
CALCIUM SERPL-MCNC: 9.6 MG/DL (ref 8.4–10.2)
CHLORIDE SERPL-SCNC: 103 MMOL/L (ref 96–108)
CO2 SERPL-SCNC: 28 MMOL/L (ref 21–32)
CREAT SERPL-MCNC: 0.99 MG/DL (ref 0.6–1.3)
EOSINOPHIL # BLD AUTO: 0.23 THOUSAND/ÂΜL (ref 0–0.61)
EOSINOPHIL NFR BLD AUTO: 2 % (ref 0–6)
ERYTHROCYTE [DISTWIDTH] IN BLOOD BY AUTOMATED COUNT: 13.6 % (ref 11.6–15.1)
GFR SERPL CREATININE-BSD FRML MDRD: 107 ML/MIN/1.73SQ M
GLUCOSE SERPL-MCNC: 85 MG/DL (ref 65–140)
HCT VFR BLD AUTO: 51 % (ref 36.5–49.3)
HGB BLD-MCNC: 16.8 G/DL (ref 12–17)
IMM GRANULOCYTES # BLD AUTO: 0.06 THOUSAND/UL (ref 0–0.2)
IMM GRANULOCYTES NFR BLD AUTO: 1 % (ref 0–2)
LITHIUM SERPL-SCNC: 1.5 MMOL/L (ref 0.6–1.2)
LYMPHOCYTES # BLD AUTO: 1.9 THOUSANDS/ÂΜL (ref 0.6–4.47)
LYMPHOCYTES NFR BLD AUTO: 15 % (ref 14–44)
MCH RBC QN AUTO: 28.8 PG (ref 26.8–34.3)
MCHC RBC AUTO-ENTMCNC: 32.9 G/DL (ref 31.4–37.4)
MCV RBC AUTO: 88 FL (ref 82–98)
MONOCYTES # BLD AUTO: 0.73 THOUSAND/ÂΜL (ref 0.17–1.22)
MONOCYTES NFR BLD AUTO: 6 % (ref 4–12)
NEUTROPHILS # BLD AUTO: 10.11 THOUSANDS/ÂΜL (ref 1.85–7.62)
NEUTS SEG NFR BLD AUTO: 75 % (ref 43–75)
NRBC BLD AUTO-RTO: 0 /100 WBCS
PLATELET # BLD AUTO: 429 THOUSANDS/UL (ref 149–390)
PMV BLD AUTO: 10.3 FL (ref 8.9–12.7)
POTASSIUM SERPL-SCNC: 3.5 MMOL/L (ref 3.5–5.3)
PROT SERPL-MCNC: 7.2 G/DL (ref 6.4–8.4)
RBC # BLD AUTO: 5.83 MILLION/UL (ref 3.88–5.62)
SODIUM SERPL-SCNC: 135 MMOL/L (ref 135–147)
WBC # BLD AUTO: 13.14 THOUSAND/UL (ref 4.31–10.16)

## 2023-09-15 PROCEDURE — 99223 1ST HOSP IP/OBS HIGH 75: CPT | Performed by: INTERNAL MEDICINE

## 2023-09-15 PROCEDURE — 96360 HYDRATION IV INFUSION INIT: CPT

## 2023-09-15 PROCEDURE — 84439 ASSAY OF FREE THYROXINE: CPT | Performed by: NURSE PRACTITIONER

## 2023-09-15 PROCEDURE — 80178 ASSAY OF LITHIUM: CPT | Performed by: EMERGENCY MEDICINE

## 2023-09-15 PROCEDURE — 36415 COLL VENOUS BLD VENIPUNCTURE: CPT | Performed by: EMERGENCY MEDICINE

## 2023-09-15 PROCEDURE — 84443 ASSAY THYROID STIM HORMONE: CPT | Performed by: NURSE PRACTITIONER

## 2023-09-15 PROCEDURE — 99284 EMERGENCY DEPT VISIT MOD MDM: CPT

## 2023-09-15 PROCEDURE — 85025 COMPLETE CBC W/AUTO DIFF WBC: CPT | Performed by: EMERGENCY MEDICINE

## 2023-09-15 PROCEDURE — 99285 EMERGENCY DEPT VISIT HI MDM: CPT | Performed by: EMERGENCY MEDICINE

## 2023-09-15 PROCEDURE — 80053 COMPREHEN METABOLIC PANEL: CPT | Performed by: EMERGENCY MEDICINE

## 2023-09-15 RX ORDER — QUETIAPINE 400 MG/1
400 TABLET, FILM COATED, EXTENDED RELEASE ORAL
COMMUNITY

## 2023-09-15 RX ORDER — CLONAZEPAM 1 MG/1
1 TABLET ORAL
COMMUNITY

## 2023-09-15 RX ADMIN — SODIUM CHLORIDE 1000 ML: 0.9 INJECTION, SOLUTION INTRAVENOUS at 20:58

## 2023-09-16 VITALS
OXYGEN SATURATION: 97 % | WEIGHT: 195.77 LBS | HEART RATE: 74 BPM | TEMPERATURE: 97.5 F | RESPIRATION RATE: 18 BRPM | SYSTOLIC BLOOD PRESSURE: 104 MMHG | DIASTOLIC BLOOD PRESSURE: 58 MMHG | HEIGHT: 72 IN | BODY MASS INDEX: 26.52 KG/M2

## 2023-09-16 PROBLEM — D72.829 LEUKOCYTOSIS: Status: ACTIVE | Noted: 2023-09-16

## 2023-09-16 PROBLEM — K21.9 GASTROESOPHAGEAL REFLUX DISEASE WITHOUT ESOPHAGITIS: Chronic | Status: ACTIVE | Noted: 2023-09-16

## 2023-09-16 PROBLEM — D75.839 THROMBOCYTOSIS: Status: ACTIVE | Noted: 2023-09-16

## 2023-09-16 LAB
ALBUMIN SERPL BCP-MCNC: 3.5 G/DL (ref 3.5–5)
ALP SERPL-CCNC: 126 U/L (ref 34–104)
ALT SERPL W P-5'-P-CCNC: 36 U/L (ref 7–52)
ANION GAP SERPL CALCULATED.3IONS-SCNC: 2 MMOL/L
ANION GAP SERPL CALCULATED.3IONS-SCNC: 4 MMOL/L
AST SERPL W P-5'-P-CCNC: 24 U/L (ref 13–39)
ATRIAL RATE: 73 BPM
ATRIAL RATE: 76 BPM
BACTERIA UR QL AUTO: NORMAL /HPF
BASOPHILS # BLD AUTO: 0.07 THOUSANDS/ÂΜL (ref 0–0.1)
BASOPHILS NFR BLD AUTO: 1 % (ref 0–1)
BILIRUB SERPL-MCNC: 0.31 MG/DL (ref 0.2–1)
BILIRUB UR QL STRIP: NEGATIVE
BUN SERPL-MCNC: 4 MG/DL (ref 5–25)
BUN SERPL-MCNC: 5 MG/DL (ref 5–25)
CALCIUM SERPL-MCNC: 8.6 MG/DL (ref 8.4–10.2)
CALCIUM SERPL-MCNC: 9 MG/DL (ref 8.4–10.2)
CHLORIDE SERPL-SCNC: 107 MMOL/L (ref 96–108)
CHLORIDE SERPL-SCNC: 108 MMOL/L (ref 96–108)
CLARITY UR: CLEAR
CO2 SERPL-SCNC: 26 MMOL/L (ref 21–32)
CO2 SERPL-SCNC: 26 MMOL/L (ref 21–32)
COLOR UR: COLORLESS
CREAT SERPL-MCNC: 0.8 MG/DL (ref 0.6–1.3)
CREAT SERPL-MCNC: 0.82 MG/DL (ref 0.6–1.3)
EOSINOPHIL # BLD AUTO: 0.33 THOUSAND/ÂΜL (ref 0–0.61)
EOSINOPHIL NFR BLD AUTO: 4 % (ref 0–6)
ERYTHROCYTE [DISTWIDTH] IN BLOOD BY AUTOMATED COUNT: 13.8 % (ref 11.6–15.1)
GFR SERPL CREATININE-BSD FRML MDRD: 125 ML/MIN/1.73SQ M
GFR SERPL CREATININE-BSD FRML MDRD: 126 ML/MIN/1.73SQ M
GLUCOSE P FAST SERPL-MCNC: 103 MG/DL (ref 65–99)
GLUCOSE SERPL-MCNC: 103 MG/DL (ref 65–140)
GLUCOSE SERPL-MCNC: 104 MG/DL (ref 65–140)
GLUCOSE SERPL-MCNC: 88 MG/DL (ref 65–140)
GLUCOSE UR STRIP-MCNC: NEGATIVE MG/DL
HCT VFR BLD AUTO: 44.3 % (ref 36.5–49.3)
HGB BLD-MCNC: 14.3 G/DL (ref 12–17)
HGB UR QL STRIP.AUTO: NEGATIVE
IMM GRANULOCYTES # BLD AUTO: 0.06 THOUSAND/UL (ref 0–0.2)
IMM GRANULOCYTES NFR BLD AUTO: 1 % (ref 0–2)
KETONES UR STRIP-MCNC: NEGATIVE MG/DL
LEUKOCYTE ESTERASE UR QL STRIP: NEGATIVE
LITHIUM SERPL-SCNC: 0.9 MMOL/L (ref 0.6–1.2)
LITHIUM SERPL-SCNC: 1.1 MMOL/L (ref 0.6–1.2)
LITHIUM SERPL-SCNC: 1.2 MMOL/L (ref 0.6–1.2)
LYMPHOCYTES # BLD AUTO: 2.74 THOUSANDS/ÂΜL (ref 0.6–4.47)
LYMPHOCYTES NFR BLD AUTO: 29 % (ref 14–44)
MCH RBC QN AUTO: 28.3 PG (ref 26.8–34.3)
MCHC RBC AUTO-ENTMCNC: 32.3 G/DL (ref 31.4–37.4)
MCV RBC AUTO: 88 FL (ref 82–98)
MONOCYTES # BLD AUTO: 0.79 THOUSAND/ÂΜL (ref 0.17–1.22)
MONOCYTES NFR BLD AUTO: 8 % (ref 4–12)
NEUTROPHILS # BLD AUTO: 5.43 THOUSANDS/ÂΜL (ref 1.85–7.62)
NEUTS SEG NFR BLD AUTO: 57 % (ref 43–75)
NITRITE UR QL STRIP: NEGATIVE
NON-SQ EPI CELLS URNS QL MICRO: NORMAL /HPF
NRBC BLD AUTO-RTO: 0 /100 WBCS
P AXIS: 80 DEGREES
PH UR STRIP.AUTO: 7 [PH]
PLATELET # BLD AUTO: 325 THOUSANDS/UL (ref 149–390)
PMV BLD AUTO: 10.4 FL (ref 8.9–12.7)
POTASSIUM SERPL-SCNC: 3.6 MMOL/L (ref 3.5–5.3)
POTASSIUM SERPL-SCNC: 3.6 MMOL/L (ref 3.5–5.3)
PR INTERVAL: 192 MS
PROT SERPL-MCNC: 5.4 G/DL (ref 6.4–8.4)
PROT UR STRIP-MCNC: NEGATIVE MG/DL
QRS AXIS: 69 DEGREES
QRS AXIS: 77 DEGREES
QRSD INTERVAL: 104 MS
QRSD INTERVAL: 90 MS
QT INTERVAL: 394 MS
QT INTERVAL: 396 MS
QTC INTERVAL: 434 MS
QTC INTERVAL: 445 MS
RBC # BLD AUTO: 5.05 MILLION/UL (ref 3.88–5.62)
RBC #/AREA URNS AUTO: NORMAL /HPF
SODIUM SERPL-SCNC: 136 MMOL/L (ref 135–147)
SODIUM SERPL-SCNC: 137 MMOL/L (ref 135–147)
SP GR UR STRIP.AUTO: 1 (ref 1–1.03)
T WAVE AXIS: 43 DEGREES
T WAVE AXIS: 74 DEGREES
T4 FREE SERPL-MCNC: 0.65 NG/DL (ref 0.61–1.12)
TSH SERPL DL<=0.05 MIU/L-ACNC: 5.87 UIU/ML (ref 0.45–4.5)
UROBILINOGEN UR STRIP-ACNC: <2 MG/DL
VENTRICULAR RATE: 73 BPM
VENTRICULAR RATE: 76 BPM
WBC # BLD AUTO: 9.42 THOUSAND/UL (ref 4.31–10.16)
WBC #/AREA URNS AUTO: NORMAL /HPF

## 2023-09-16 PROCEDURE — 85025 COMPLETE CBC W/AUTO DIFF WBC: CPT | Performed by: NURSE PRACTITIONER

## 2023-09-16 PROCEDURE — 80178 ASSAY OF LITHIUM: CPT | Performed by: NURSE PRACTITIONER

## 2023-09-16 PROCEDURE — 80178 ASSAY OF LITHIUM: CPT | Performed by: INTERNAL MEDICINE

## 2023-09-16 PROCEDURE — 80048 BASIC METABOLIC PNL TOTAL CA: CPT | Performed by: NURSE PRACTITIONER

## 2023-09-16 PROCEDURE — 82948 REAGENT STRIP/BLOOD GLUCOSE: CPT

## 2023-09-16 PROCEDURE — 93010 ELECTROCARDIOGRAM REPORT: CPT | Performed by: INTERNAL MEDICINE

## 2023-09-16 PROCEDURE — 80053 COMPREHEN METABOLIC PANEL: CPT | Performed by: NURSE PRACTITIONER

## 2023-09-16 PROCEDURE — 93005 ELECTROCARDIOGRAM TRACING: CPT

## 2023-09-16 PROCEDURE — 81001 URINALYSIS AUTO W/SCOPE: CPT | Performed by: NURSE PRACTITIONER

## 2023-09-16 PROCEDURE — 99239 HOSP IP/OBS DSCHRG MGMT >30: CPT | Performed by: INTERNAL MEDICINE

## 2023-09-16 RX ORDER — CLONAZEPAM 1 MG/1
1 TABLET ORAL
Status: DISCONTINUED | OUTPATIENT
Start: 2023-09-16 | End: 2023-09-16 | Stop reason: HOSPADM

## 2023-09-16 RX ORDER — ACETAMINOPHEN 325 MG/1
975 TABLET ORAL EVERY 6 HOURS PRN
Status: DISCONTINUED | OUTPATIENT
Start: 2023-09-16 | End: 2023-09-16 | Stop reason: HOSPADM

## 2023-09-16 RX ORDER — QUETIAPINE 400 MG/1
400 TABLET, FILM COATED, EXTENDED RELEASE ORAL
Status: DISCONTINUED | OUTPATIENT
Start: 2023-09-16 | End: 2023-09-16 | Stop reason: HOSPADM

## 2023-09-16 RX ORDER — QUETIAPINE 200 MG/1
200 TABLET, FILM COATED, EXTENDED RELEASE ORAL
Status: DISCONTINUED | OUTPATIENT
Start: 2023-09-16 | End: 2023-09-16 | Stop reason: HOSPADM

## 2023-09-16 RX ORDER — PANTOPRAZOLE SODIUM 20 MG/1
20 TABLET, DELAYED RELEASE ORAL
Status: DISCONTINUED | OUTPATIENT
Start: 2023-09-16 | End: 2023-09-16 | Stop reason: HOSPADM

## 2023-09-16 RX ORDER — SODIUM CHLORIDE 9 MG/ML
200 INJECTION, SOLUTION INTRAVENOUS CONTINUOUS
Status: DISCONTINUED | OUTPATIENT
Start: 2023-09-16 | End: 2023-09-16 | Stop reason: HOSPADM

## 2023-09-16 RX ORDER — FLUVOXAMINE MALEATE 50 MG/1
150 TABLET, COATED ORAL 2 TIMES DAILY
Status: DISCONTINUED | OUTPATIENT
Start: 2023-09-16 | End: 2023-09-16 | Stop reason: HOSPADM

## 2023-09-16 RX ORDER — MAGNESIUM HYDROXIDE/ALUMINUM HYDROXICE/SIMETHICONE 120; 1200; 1200 MG/30ML; MG/30ML; MG/30ML
30 SUSPENSION ORAL EVERY 6 HOURS PRN
Status: DISCONTINUED | OUTPATIENT
Start: 2023-09-16 | End: 2023-09-16 | Stop reason: HOSPADM

## 2023-09-16 RX ORDER — PROMETHAZINE HYDROCHLORIDE 25 MG/1
25 TABLET ORAL EVERY 6 HOURS PRN
Status: DISCONTINUED | OUTPATIENT
Start: 2023-09-16 | End: 2023-09-16 | Stop reason: HOSPADM

## 2023-09-16 RX ADMIN — SODIUM CHLORIDE 150 ML/HR: 0.9 INJECTION, SOLUTION INTRAVENOUS at 01:10

## 2023-09-16 RX ADMIN — PANTOPRAZOLE SODIUM 20 MG: 20 TABLET, DELAYED RELEASE ORAL at 05:50

## 2023-09-16 RX ADMIN — CLONAZEPAM 1 MG: 1 TABLET ORAL at 00:57

## 2023-09-16 RX ADMIN — FLUVOXAMINE MALEATE 150 MG: 50 TABLET ORAL at 01:39

## 2023-09-16 RX ADMIN — FLUVOXAMINE MALEATE 150 MG: 50 TABLET ORAL at 09:02

## 2023-09-16 RX ADMIN — SODIUM CHLORIDE 1000 ML: 0.9 INJECTION, SOLUTION INTRAVENOUS at 00:07

## 2023-09-16 RX ADMIN — SODIUM CHLORIDE 1000 ML: 0.9 INJECTION, SOLUTION INTRAVENOUS at 10:56

## 2023-09-16 RX ADMIN — QUETIAPINE FUMARATE 200 MG: 200 TABLET, EXTENDED RELEASE ORAL at 01:37

## 2023-09-16 RX ADMIN — QUETIAPINE FUMARATE 400 MG: 400 TABLET, EXTENDED RELEASE ORAL at 01:37

## 2023-09-16 NOTE — CONSULTS
TeleConsultation - [facility] 26 y.o. male MRN: [id number]  Unit/Bed#: E4 -01 Encounter: [id number]        REQUIRED DOCUMENTATION:     1. This service was provided via Telemedicine. 2. Provider located at [facility]. 3. TeleMed provider: Broderick Kong MD.  4. Identify all parties in room with patient during tele consult:  Patient and nurse  5. Patient was then informed that this was a Telemedicine visit and that the exam was being conducted confidentially over secure lines. My office door was closed. No one else was in the room. Patient acknowledged consent and understanding of privacy and security of the Telemedicine visit, and gave us permission to have the assistant stay in the room in order to assist with the history and to conduct the exam.  I informed the patient that I have reviewed their record in Epic and presented the opportunity for them to ask any questions regarding the visit today. The patient agreed to participate. Assessment/Plan     Present on Admission:  • Lithium toxicity  • Major depressive disorder  • Gastroesophageal reflux disease without esophagitis  • Thrombocytosis  • Leukocytosis    Assessment:  Patient is a 73-year-old male, domiciled with a roommate, currently in school, with a diagnosis of major depressive disorder and ADHD historically, pertinent medical history of gastric surgery and currently started on Ozempic August 2023. Patient presented last week with nausea and vomiting and possible dehydration. Yesterday patient complained of symptoms of lithium toxicity and was found to have elevated lithium level of 1.5 on arrival.  Patient is doing better, reports a decreased and symptoms, is looking forward to being discharged home and follow-up with his primary care provider. At this time patient does not meet criteria for inpatient psychiatric hospitalization.     Major Depressive Disorder  ADHD, Unspecified Type    Treatment Plan:  Patient is cleared psychiatrically for discharge as per primary team.  Patient will follow-up with primary psychiatrist 2 to 5 days from discharge. Patient may continue the following medications: Clonazepam, Seroquel, fluvoxamine as per his scheduled doses    Planned Medication Changes:    Hold lithium at this time  Patient will follow-up with primary psychiatrist within 2 to 5 days    Current Medications:     Current Facility-Administered Medications   Medication Dose Route Frequency Provider Last Rate   • acetaminophen  975 mg Oral Q6H PRN Vargas Otter Tail, CRNP     • aluminum-magnesium hydroxide-simethicone  30 mL Oral Q6H PRN Vargas Otter Tail, CRNP     • clonazePAM  1 mg Oral HS Vargas Otter Tail, CRNP     • fluvoxaMINE  150 mg Oral BID Vargas Otter Tail, CRNP     • pantoprazole  20 mg Oral Early Morning Vargas Otter Tail, CRNP     • promethazine  25 mg Oral Q6H PRN Vargas Otter Tail, CRNP     • QUEtiapine  200 mg Oral HS Vargas Otter Tail, CRNP     • QUEtiapine  400 mg Oral HS Vargas Otter Tail, CRNP     • sodium chloride  200 mL/hr Intravenous Continuous Wali Henry  mL/hr (09/16/23 0751)       Risks / Benefits of Treatment:    Risks, benefits, and possible side effects of medications explained to patient and patient verbalizes understanding. Other treatment modalities recommended as indicated:    · None applicable at this time      Consults  Physician Requesting Consult: Wali Henry DO  Principal Problem:Lithium toxicity    Reason for Consult: Lithium toxicity      History of Present Illness    Patient is a 76-year-old male, domiciled with a roommate, currently in school, with a diagnosis of major depressive disorder and ADHD historically, pertinent medical history of gastric surgery and currently started on Ozempic August 2023. Patient presented last week with nausea and vomiting and possible dehydration.   Yesterday patient complained of symptoms of lithium toxicity and was found to have elevated lithium level of 1.5 on arrival.     Patient reports that his mood is currently stable. He denies current depressive symptoms, denies psychotic symptoms, denies manic symptoms, denies active/passive DOREEN IP. No other complaint.      Psychiatric Review Of Systems:    sleep: yes  appetite changes: no  weight changes: no  energy/anergy: no  interest/pleasure/anhedonia: no  somatic symptoms: no  anxiety/panic: no  karyn: no  guilty/hopeless: no  self injurious behavior/risky behavior: no    Historical Information     Past Psychiatric History:     Psychiatric Hospitalizations:   • No history of past inpatient psychiatric admissions  Outpatient Treatment History:   • Dr Meaghan Jimenezct [phone]   Suicide Attempts:   • None  History of self-harm:   • None  Violence History:   • no  Past Psychiatric medication trials: Lithium was started and April 2023    Substance Abuse History:    Social History     Socioeconomic History   • Marital status: Single     Spouse name: Not on file   • Number of children: Not on file   • Years of education: Not on file   • Highest education level: Not on file   Occupational History   • Not on file   Tobacco Use   • Smoking status: Some Days     Types: Cigarettes   • Smokeless tobacco: Never   Vaping Use   • Vaping Use: Never used   Substance and Sexual Activity   • Alcohol use: Not Currently     Comment: occasional   • Drug use: Not Currently   • Sexual activity: Not on file   Other Topics Concern   • Not on file   Social History Narrative   • Not on file     Social Determinants of Health     Financial Resource Strain: Not on file   Food Insecurity: Not on file   Transportation Needs: Not on file   Physical Activity: Not on file   Stress: Not on file   Social Connections: Not on file   Intimate Partner Violence: Not on file   Housing Stability: Not on file         Family Psychiatric History:      Did not report    Social History:     current student, lives with roommate, parents live an hour away      Traumatic History:     Abuse: Did not report  Other Traumatic Events: Did not report    Past Medical History:   Diagnosis Date   • Anxiety    • Bipolar 1 disorder ([address])        Medical Review Of Systems:    Review of Systems    Meds/Allergies     all current active meds have been reviewed  No Known Allergies    Objective     Vital signs in last 24 hours:  Temp:  [97.5 °F (36.4 °C)-98.3 °F (36.8 °C)] 97.5 °F (36.4 °C)  HR:  [66-89] 74  Resp:  [18] 18  BP: ()/(58-74) 104/58      Intake/Output Summary (Last 24 hours) at 9/16/2023 0851  Last data filed at 9/15/2023 2200  Gross per 24 hour   Intake 1000 ml   Output --   Net 1000 ml       Mental Status Evaluation:    Appearance:  age appropriate and casually dressed   Behavior:  normal   Speech:  normal pitch and normal volume   Mood:  normal   Affect:  normal   Language: wnl   Thought Process:  normal   Associations intact associations   Thought Content:  normal   Perceptual Disturbances: None   Risk Potential: Suicidal Ideations none  Homicidal Ideations none  Potential for Aggression No   Sensorium:  person, place, time/date and situation   Cognition:  recent and remote memory grossly intact   Consciousness:  alert and awake    Attention: attention span and concentration were age appropriate   Intellect: within normal limits   Fund of Knowledge: awareness of current events: wnl   Insight:  age appropriate   Judgment: age appropriate and good   Muscle Strength:  Muscle Tone: not formally tested face and neck  Not formally tested   Gait/Station: normal gait/station   Motor Activity: no abnormal movements       Lab Results: I have personally reviewed all pertinent laboratory/tests results.      Most Recent Labs:   Lab Results   Component Value Date    WBC 13.14 (H) 09/15/2023    RBC 5.83 (H) 09/15/2023    HGB 16.8 09/15/2023    HCT 51.0 (H) 09/15/2023     (H) 09/15/2023    RDW 13.6 09/15/2023    NEUTROABS 10.11 (H) 09/15/2023    SODIUM 137 09/16/2023    K 3.6 09/16/2023     09/16/2023    CO2 26 09/16/2023    BUN 5 09/16/2023    CREATININE 0.82 09/16/2023    GLUC 103 09/16/2023    GLUF 103 (H) 09/16/2023    CALCIUM 9.0 09/16/2023    AST 30 09/15/2023    ALT 49 09/15/2023    ALKPHOS 163 (H) 09/15/2023    TP 7.2 09/15/2023    ALB 4.7 09/15/2023    TBILI 0.38 09/15/2023    LITHIUM 1.2 09/16/2023    EQG9YCGDFGNY 5.873 (H) 09/15/2023       Imaging Studies: CT abdomen pelvis with contrast    Result Date: 9/9/2023  Narrative: CT ABDOMEN AND PELVIS WITH IV CONTRAST INDICATION:   Nausea/vomiting hx of bariatric surgery, persistent N/V x1 wk. COMPARISON:  None. TECHNIQUE:  CT examination of the abdomen and pelvis was performed. Multiplanar 2D reformatted images were created from the source data. This examination, like all CT scans performed in the [facility], was performed utilizing techniques to minimize radiation dose exposure, including the use of iterative reconstruction and automated exposure control. Radiation dose length product (DLP) for this visit:  496 mGy-cm IV Contrast: 100 mL of iohexol (OMNIPAQUE) Enteric Contrast: Enteric contrast was administered. FINDINGS: ABDOMEN LOWER CHEST:  No clinically significant abnormality identified in the visualized lower chest. LIVER/BILIARY TREE: Focal fatty infiltration in the medial left hepatic lobe adjacent to the fissure for the falciform ligament. No suspicious hepatic mass. The hepatic and portal veins are patent. No biliary ductal dilatation. GALLBLADDER: Gallbladder is surgically absent. SPLEEN: Mild splenomegaly measuring 13.9 cm craniocaudally. PANCREAS:  Unremarkable. ADRENAL GLANDS:  Unremarkable. KIDNEYS/URETERS:  Unremarkable. No hydronephrosis. STOMACH AND BOWEL: Postsurgical changes of gastroplasty duodenal switch. Fluid-filled loops of small and large bowel, likely diarrheal in etiology. No evidence of bowel obstruction.  APPENDIX:  No findings to suggest appendicitis. ABDOMINOPELVIC CAVITY:  No ascites. No pneumoperitoneum. No lymphadenopathy. VESSELS:  Unremarkable for patient's age. PELVIS REPRODUCTIVE ORGANS:  Unremarkable for patient's age. URINARY BLADDER:  Unremarkable. ABDOMINAL WALL/INGUINAL REGIONS:  Unremarkable. OSSEOUS STRUCTURES:  No acute fracture or destructive osseous lesion. Impression: Fluid-filled loops of small and large bowel, likely diarrheal in etiology. Postsurgical changes of gastroplasty duodenal switch. Mild splenomegaly. Focal fatty infiltration in the medial aspect lobe adjacent to the fissure for the falciform ligament. Workstation performed: [id number]     XR foot 3+ views RIGHT    Result Date: 9/7/2023  Narrative: RIGHT FOOT INDICATION:   pain. COMPARISON:  None VIEWS:  XR FOOT 3+ VW RIGHT FINDINGS: There is no acute fracture or dislocation. No significant degenerative changes. No lytic or blastic osseous lesion. Soft tissues are unremarkable. Impression: No acute osseous abnormality. Workstation performed: [id number]     XR ankle 3+ views RIGHT    Result Date: 9/7/2023  Narrative: RIGHT ANKLE INDICATION:   pain. COMPARISON:  None VIEWS:  XR ANKLE 3+ VW RIGHT Images: 3 FINDINGS: There is no acute fracture or dislocation. No significant degenerative changes. No lytic or blastic osseous lesion. Soft tissues are unremarkable. Impression: No acute osseous abnormality. Workstation performed: [id number]     EKG/Pathology/Other Studies:   Lab Results   Component Value Date    VENTRATE 73 09/16/2023    ATRIALRATE 73 09/16/2023    PRINT 192 09/16/2023    QRSDINT 104 09/16/2023    QTINT 394 09/16/2023    QTCINT 434 09/16/2023    PAXIS 80 09/16/2023    QRSAXIS 69 09/16/2023    TWAVEAXIS 43 09/16/2023        Code Status: Level 1 - Full Code  Advance Directive and Living Will:      Power of :    POLST:      Screenings:    1. Nutrition Screening  · Not obtained    2.  Pain Screening  Pain Screening: Pain Assessment  Pain Assessment Tool: 0-10  Pain Score: 0  Patient's Stated Pain Goal: No pain    3. Suicide Screening  C-SSRS - Inpatient Discharge Screener  1. While you were in the hospital, have you wished you were dead or wished you could go to sleep and not wake up? No  2. Have you actually had thoughts about killing yourself? No  If Yes to question 2, ask questions 3,4,5, and 6. If No to question 2, skip to question 6  3. Have you been thinking about how you might kill yourself? _   a. If Yes  - describe frequency, intensity and duration:_  4. Have you had these thoughts and had some intention of acting on them or do you have some intention of acting on them after you leave the hospital?_   a. If yes - describe the extent to which the patient expects to carry out the plan an believes the plan to be lethal or self-injurious: _  5. Have you started to work out or worked out the details of how to kill yourself either for a while you were here in the hospital or for after you leave the hospital? Did you intend to carry out this plan?_   a. If yes - describe timing, location, lethality, access to means, preparatory acts: _  Always ask  Question 6  6. While you were here in the hospital, gave you done anything, started to do anything, or prepared to do anything to end your life? NO   a. If yes, please describe: _     LOW RISK    Counseling / Coordination of Care: Total floor / unit time spent today 45 minutes. Greater than 50% of total time was spent with the patient and / or family counseling and / or coordination of care.  A description of the counseling / coordination of care: Direct patient care, chart review handoff to primary team

## 2023-09-16 NOTE — DISCHARGE SUMMARY
[facility]  Discharge- Natasha Salvador 2000, 26 y.o. male MRN: [id number]  Unit/Bed#: E4 -01 Encounter: [id number]  Primary Care Provider: No primary care provider on file. Date and time admitted to hospital: 9/15/2023  8:26 PM    * Lithium toxicity  Assessment & Plan  Unintentional  Mild symptomatic toxicity with lithium level 1.5  Symptoms resolved with aggressive IV fluids and correlated with the level less than 1 prior to discharge  Appreciate psychiatry recommendations  Hold lithium on discharge until seen by outpatient psychiatrist    Leukocytosis  Assessment & Plan  Likely reactive  Resolved    Gastroesophageal reflux disease without esophagitis  Assessment & Plan  · Continue PPI    History of sleeve gastrectomy  Assessment & Plan  · Hx of sleeve gastrectomy converted to gastroplasty duodenal switch    Major depressive disorder  Assessment & Plan  · Continue Seroquel and fluvoxamine      Discharging Physician / Practitioner: Finesse Her DO  PCP: No primary care provider on file. Admission Date:   Admission Orders (From admission, onward)     Ordered        09/15/23 2250  Place in Observation  Once                      Discharge Date: 09/16/23    Medical Problems     Resolved Problems  Date Reviewed: 9/15/2023   None         Consultations During Hospital Stay:   IP CONSULT TO PSYCHIATRY    Procedures Performed: None    Significant Findings / Test Results:     No results found. Incidental Findings: None    Test Results Pending at Discharge (will require follow up): None     Outpatient Tests Requested: None    Reason for Admission: [place] toxicity     Hospital Course:     Natasha Salvador is a 26 y.o. male Patient is a 44-year-old male with past medical history of major depressive disorder maintained on Seroquel, fluvoxamine, lithium who presents to the hospital with tremors, gait dysfunction, blurred vision.   Lithium level was found to be 1.5 and he was started on aggressive IV fluids. He was monitored until his lithium level was maintained below 1. Patient's symptoms resolved. Discharged with close outpatient follow-up with psychiatry. Please see above list of diagnoses and related plan for additional information. Condition at Discharge: stable     Discharge Day Visit / Exam:     Subjective: Patient seen and evaluated at bedside. Reports resolution of symptoms. Would like to be discharged home. Vitals: Blood Pressure: 104/58 (09/16/23 0839)  Pulse: 74 (09/16/23 0839)  Temperature: 97.5 °F (36.4 °C) (09/16/23 0745)  Temp Source: Temporal (09/16/23 0745)  Respirations: 18 (09/16/23 0745)  Height: 6' (182.9 cm) (09/16/23 0036)  Weight - Scale: 88.8 kg (195 lb 12.3 oz) (09/16/23 0036)  SpO2: 97 % (09/16/23 0745)  Exam:   Physical Exam  Vitals reviewed. Constitutional:       General: He is not in acute distress. Appearance: He is well-developed. He is not ill-appearing, toxic-appearing or diaphoretic. HENT:      Head: Normocephalic and atraumatic. Mouth/Throat:      Mouth: Mucous membranes are moist.   Eyes:      General: No scleral icterus. Extraocular Movements: Extraocular movements intact. Cardiovascular:      Rate and Rhythm: Normal rate and regular rhythm. Heart sounds: Normal heart sounds. Pulmonary:      Effort: Pulmonary effort is normal. No respiratory distress. Breath sounds: Normal breath sounds. No wheezing or rales. Abdominal:      General: There is no distension. Palpations: Abdomen is soft. Tenderness: There is no abdominal tenderness. There is no guarding or rebound. Musculoskeletal:         General: No swelling, tenderness or deformity. Skin:     General: Skin is warm and dry. Neurological:      General: No focal deficit present. Mental Status: He is alert. Mental status is at baseline.    Psychiatric:         Mood and Affect: Mood normal.         Behavior: Behavior normal.         Thought Content: Thought content normal.         Judgment: Judgment normal.      Comments: Appears anxious            Discharge instructions/Information to patient and family:   See after visit summary for information provided to patient and family. Provisions for Follow-Up Care:  See after visit summary for information related to follow-up care and any pertinent home health orders. Disposition:     Home     Discharge Statement:  I spent 60 minutes discharging the patient. This time was spent on the day of discharge. I had direct contact with the patient on the day of discharge. Greater than 50% of the total time was spent examining patient, answering all patient questions, arranging and discussing plan of care with patient as well as directly providing post-discharge instructions. Additional time then spent on discharge activities. Discharge Medications:  See after visit summary for reconciled discharge medications provided to patient and family.       ** Please Note: This note has been constructed using a voice recognition system **

## 2023-09-16 NOTE — PLAN OF CARE
Problem: Knowledge Deficit  Goal: Patient/family/caregiver demonstrates understanding of disease process, treatment plan, medications, and discharge instructions  Description: Complete learning assessment and assess knowledge base.   Interventions:  - Provide teaching at level of understanding  - Provide teaching via preferred learning methods  Outcome: Progressing     Problem: SAFETY ADULT  Goal: Patient will remain free of falls  Description: INTERVENTIONS:  - Educate patient/family on patient safety including physical limitations  - Instruct patient to call for assistance with activity   - Keep Call bell within reach  - Keep bed low and locked with side rails adjusted as appropriate  - Keep care items and personal belongings within reach  - Initiate and maintain comfort rounds

## 2023-09-16 NOTE — H&P
[address] [facility]  H&P  Name: Dennis Client 26 y.o. male I MRN: [id number]  Unit/Bed#: ED-41 I Date of Admission: 9/15/2023   Date of Service: 9/16/2023 I Hospital Day: 0      Assessment/Plan   * Lithium toxicity  Assessment & Plan  · Unintentional lithium toxicity in setting of recent GI illness with vomiting and diarrhea  · Lithium level 1.5, mild toxicity  · Reports tremors, gait dysfunction and intermittent blurred vision  · Normal creatinine  · Maintained on Li 600mg qa. m and 1200mg qhs. Last dose taken this morning; missed last nights dose  · Hold further doses of lithium for now  · Check EKG, TSH, UA  · Provide aggressive IV hydration with 0.9NS to correct hypovolemia and increase renal excretion of lithium   · Monitor I/O to ensure adequate kidney perfusion and appropriate clearance  · Monitor creatinine, Na, and serum lithium concentrations q6h until symptoms resolve  · Prn ativan if seizure  · Psychiatry consult    Thrombocytosis  Assessment & Plan  · Likely reactive; repeat CBC in a.m. Leukocytosis  Assessment & Plan  · Suspect stress reactive leukocytosis. Repeat a.m CBC    Gastroesophageal reflux disease without esophagitis  Assessment & Plan  · Continue PPI    History of sleeve gastrectomy  Assessment & Plan  · Hx of sleeve gastrectomy converted to gastroplasty duodenal switch    Major depressive disorder  Assessment & Plan  · Hx of MDD maintained on seroquel, fluvoxamine and Li       VTE Prophylaxis: low risk  / reason for no mechanical VTE prophylaxis ambulate   Code Status: FC  POLST: POLST is not applicable to this patient  Discussion with family:     Anticipated Length of Stay:  Patient will be admitted on an Observation basis with an anticipated length of stay of  < 2 midnights. Justification for Hospital Stay: Mild Lithium toxicity    Total Time for Visit, including Counseling / Coordination of Care: 45 minutes.   Greater than 50% of this total time spent on direct patient counseling and coordination of care. Chief Complaint:   "tremors"    History of Present Illness:    Josesito Salmeron is a 26 y.o. male who presents with c/o hand tremors, imbalance and intermittent blurred vision 2/2 mild Li toxicity in setting of recent GI illness with vomiting and diarrhea. Was seen in ER last week with c/o vomiting and diarrhea x 1 week associated with ozempic. Symptoms resolved in ED with antiemetic and fluids and he was discharged home. Reports no further vomiting and diarrhea, has been hydrating well but eating less due to decreased appetite from ozempic. Takes Lithium 600mg daily, 1200mg qhs, last taken this morning; missed yesterday's p.m dose. He called his psychiatrist who advised he go to ED for further evaluation and measurement of Li level. Denies any medication changes, has not taken Tyl or NSAIDs. Denies ETOH use, smokes marijuana. ROS neg for confusion, slurred speech or lethargy. Admits he is taking phentermine for weight mgmt. Review of Systems:    Review of Systems   Constitutional: Negative. HENT: Negative. Eyes: Positive for visual disturbance. Intermittent blurred vision   Respiratory: Negative. Cardiovascular: Negative. Gastrointestinal: Negative. Genitourinary: Negative. Musculoskeletal: Positive for gait problem. Imbalance   Neurological: Negative for seizures and weakness. Tremors    Psychiatric/Behavioral: Negative for suicidal ideas. Past Medical and Surgical History:     Past Medical History:   Diagnosis Date   • Anxiety    • Bipolar 1 disorder ([address])        Past Surgical History:   Procedure Laterality Date   • CHOLECYSTECTOMY     • GASTROPLASTY DUODENAL SWITCH         Meds/Allergies:    Prior to Admission medications    Medication Sig Start Date End Date Taking?  Authorizing Provider   chlorhexidine (PERIDEX) 0.12 % solution RINSE AND SPIT FOR THIRTY SECONDS TWICE DAILY FOR 2 WEEKS  Patient not taking: Reported on 9/7/2023 8/7/23   Historical Provider, MD   dicyclomine (BENTYL) 20 mg tablet Take 1 tablet (20 mg total) by mouth every 6 (six) hours as needed (diarrhea/crampy abdominal pain) 9/9/23   Teresa Kwok DO   fluvoxaMINE (LUVOX) 100 mg tablet TAKE 1 AND A HALF TABLETS BY MOUTH TWICE DAILY 9/3/23   Historical Provider, MD   ketorolac (TORADOL) 10 mg tablet Take 1 tablet (10 mg total) by mouth every 6 (six) hours as needed for moderate pain for up to 8 doses 9/7/23   Fransisco Acosta MD   lithium 600 MG capsule Take 600 mg by mouth 3 (three) times a day 8/22/23   Historical Provider, MD   LORazepam (ATIVAN) 0.5 mg tablet TAKE 1 TABLET BY MOUTH ONCE DAILY AS NEEDED FOR ANXIETY  Patient not taking: Reported on 9/7/2023 7/9/23   Historical Provider, MD   meloxicam (Mobic) 15 mg tablet Take 1 tablet (15 mg total) by mouth daily 9/7/23   Giovanny Chapman DPM   pantoprazole (PROTONIX) 20 mg tablet Take 1 tablet (20 mg total) by mouth daily for 4 doses 9/7/23 9/11/23  Fransisco Acosta MD   pantoprazole (PROTONIX) 40 mg tablet Take 40 mg by mouth daily  Patient not taking: Reported on 9/7/2023 7/13/23   Historical Provider, MD   promethazine (PHENERGAN) 25 mg tablet Take 1 tablet (25 mg total) by mouth every 6 (six) hours as needed for nausea or vomiting 9/9/23   Teresa Kwok DO   QUEtiapine (SEROquel XR) 200 mg 24 hr tablet Take 200 mg by mouth daily at bedtime 8/22/23   Historical Provider, MD   triamcinolone (KENALOG) 0.1 % oral topical paste APPLY THIN FILM ON AFFECTED AREA 3-4 TIMES A DAY AFTER EATING AND AT BEDTIME  Patient not taking: Reported on 9/7/2023 8/7/23   Historical Provider, MD     I have reviewed home medications with patient personally.     Allergies: No Known Allergies    Social History:     Marital Status: Single   Occupation: college student, [address]  Patient Pre-hospital Living Situation: resides with roommates  Patient Pre-hospital Level of Mobility: ambulatory  Patient Pre-hospital Diet Restrictions:   Substance Use History:   Social History     Substance and Sexual Activity   Alcohol Use Not Currently    Comment: occasional     Social History     Tobacco Use   Smoking Status Some Days   • Types: Cigarettes   Smokeless Tobacco Never     Social History     Substance and Sexual Activity   Drug Use Not Currently       Family History:    History reviewed. No pertinent family history. Physical Exam:     Vitals:   Blood Pressure: 100/71 (09/15/23 2315)  Pulse: 77 (09/15/23 2315)  Temperature: 98.3 °F (36.8 °C) (09/15/23 2025)  Temp Source: Oral (09/15/23 2025)  Respirations: 18 (09/15/23 2315)  Weight - Scale: 88.9 kg (195 lb 15.8 oz) (09/15/23 2025)  SpO2: 99 % (09/15/23 2315)    Physical Exam  Constitutional:       General: He is not in acute distress. Appearance: Normal appearance. He is not ill-appearing, toxic-appearing or diaphoretic. HENT:      Head: Normocephalic and atraumatic. Mouth/Throat:      Mouth: Mucous membranes are moist.   Eyes:      Conjunctiva/sclera: Conjunctivae normal.   Cardiovascular:      Rate and Rhythm: Normal rate and regular rhythm. Heart sounds: Normal heart sounds. Pulmonary:      Effort: Pulmonary effort is normal.      Breath sounds: Normal breath sounds. Abdominal:      General: Bowel sounds are normal. There is no distension. Palpations: Abdomen is soft. Tenderness: There is no abdominal tenderness. There is no guarding. Musculoskeletal:      Right lower leg: No edema. Left lower leg: No edema. Skin:     General: Skin is warm. Neurological:      Mental Status: He is alert and oriented to person, place, and time. Psychiatric:         Thought Content: Thought content normal.         Judgment: Judgment normal.      Comments: Very anxious        Additional Data:     Lab Results: I have personally reviewed pertinent reports.       Results from last 7 days   Lab Units 09/15/23  2052   WBC Thousand/uL 13.14* HEMOGLOBIN g/dL 16.8   HEMATOCRIT % 51.0*   PLATELETS Thousands/uL 429*   NEUTROS PCT % 75   LYMPHS PCT % 15   MONOS PCT % 6   EOS PCT % 2     Results from last 7 days   Lab Units 09/15/23  2052   SODIUM mmol/L 135   POTASSIUM mmol/L 3.5   CHLORIDE mmol/L 103   CO2 mmol/L 28   BUN mg/dL 6   CREATININE mg/dL 0.99   ANION GAP mmol/L 4   CALCIUM mg/dL 9.6   ALBUMIN g/dL 4.7   TOTAL BILIRUBIN mg/dL 0.38   ALK PHOS U/L 163*   ALT U/L 49   AST U/L 30   GLUCOSE RANDOM mg/dL 85                       Imaging: I have personally reviewed pertinent reports. No orders to display       EKG, Pathology, and Other Studies Reviewed on Admission:   · Ct a/p    Allscripts / Epic Records Reviewed: Yes     ** Please Note: This note has been constructed using a voice recognition system.  **

## 2023-09-16 NOTE — PLAN OF CARE
Problem: SAFETY ADULT  Goal: Patient will remain free of falls  Description: INTERVENTIONS:  - Educate patient/family on patient safety including physical limitations  - Instruct patient to call for assistance with activity   - Consult OT/PT to assist with strengthening/mobility   - Keep Call bell within reach  - Keep bed low and locked with side rails adjusted as appropriate  - Keep care items and personal belongings within reach  - Initiate and maintain comfort rounds  - Make Fall Risk Sign visible to staff  - Offer Toileting every  Hours, in advance of need  - Initiate/Maintain alarm  - Obtain necessary fall risk management equipment:   - Apply yellow socks and bracelet for high fall risk patients  - Consider moving patient to room near nurses station  Outcome: Progressing  Goal: Maintain or return to baseline ADL function  Description: INTERVENTIONS:  -  Assess patient's ability to carry out ADLs; assess patient's baseline for ADL function and identify physical deficits which impact ability to perform ADLs (bathing, care of mouth/teeth, toileting, grooming, dressing, etc.)  - Assess/evaluate cause of self-care deficits   - Assess range of motion  - Assess patient's mobility; develop plan if impaired  - Assess patient's need for assistive devices and provide as appropriate  - Encourage maximum independence but intervene and supervise when necessary  - Involve family in performance of ADLs  - Assess for home care needs following discharge   - Consider OT consult to assist with ADL evaluation and planning for discharge  - Provide patient education as appropriate  Outcome: Progressing  Goal: Maintains/Returns to pre admission functional level  Description: INTERVENTIONS:  - Perform BMAT or MOVE assessment daily.   - Set and communicate daily mobility goal to care team and patient/family/caregiver.    - Collaborate with rehabilitation services on mobility goals if consulted  - Perform Range of Motion  times a day.  - Reposition patient every  hours. - Dangle patient  times a day  - Stand patient  times a day  - Ambulate patient  times a day  - Out of bed to chair  times a day   - Out of bed for meals times a day  - Out of bed for toileting  - Record patient progress and toleration of activity level   Outcome: Progressing     Problem: DISCHARGE PLANNING  Goal: Discharge to home or other facility with appropriate resources  Description: INTERVENTIONS:  - Identify barriers to discharge w/patient and caregiver  - Arrange for needed discharge resources and transportation as appropriate  - Identify discharge learning needs (meds, wound care, etc.)  - Arrange for interpretive services to assist at discharge as needed  - Refer to Case Management Department for coordinating discharge planning if the patient needs post-hospital services based on physician/advanced practitioner order or complex needs related to functional status, cognitive ability, or social support system  Outcome: Progressing     Problem: Knowledge Deficit  Goal: Patient/family/caregiver demonstrates understanding of disease process, treatment plan, medications, and discharge instructions  Description: Complete learning assessment and assess knowledge base. Interventions:  - Provide teaching at level of understanding  - Provide teaching via preferred learning methods  Outcome: Progressing     Problem: Nutrition/Hydration-ADULT  Goal: Nutrient/Hydration intake appropriate for improving, restoring or maintaining nutritional needs  Description: Monitor and assess patient's nutrition/hydration status for malnutrition. Collaborate with interdisciplinary team and initiate plan and interventions as ordered. Monitor patient's weight and dietary intake as ordered or per policy. Utilize nutrition screening tool and intervene as necessary. Determine patient's food preferences and provide high-protein, high-caloric foods as appropriate.      INTERVENTIONS:  - Monitor oral intake, urinary output, labs, and treatment plans  - Assess nutrition and hydration status and recommend course of action  - Evaluate amount of meals eaten  - Assist patient with eating if necessary   - Allow adequate time for meals  - Recommend/ encourage appropriate diets, oral nutritional supplements, and vitamin/mineral supplements  - Order, calculate, and assess calorie counts as needed  - Recommend, monitor, and adjust tube feedings and TPN/PPN based on assessed needs  - Assess need for intravenous fluids  - Provide specific nutrition/hydration education as appropriate  - Include patient/family/caregiver in decisions related to nutrition  Outcome: Progressing

## 2023-09-16 NOTE — NURSING NOTE
Patient discharged via walking. All belongings were sent, IV was removed and AVS was discussed. Patient had no questions or concerns.

## 2023-09-16 NOTE — ED PROVIDER NOTES
History  Chief Complaint   Patient presents with   • Medical Problem     States had appointment with psychiatrist and was told to come in to have lithium level checked because she is concerned about lithium toxicity. Complaining of shaking, blurred vision, and balance issues. 27-year-old male presents for evaluation of intermittent blurred vision, feeling lightheaded, tremors for the past 3 days. They wax and wane in intensity without modifying factors. Patient states has been taking his normal lithium dose denies overdose, additional medications or recent changes medications. No chest pain, shortness of breath, headache, cough and URI symptoms, abdominal pain. History provided by:  Patient  Medical Problem  Associated symptoms: no abdominal pain, no chest pain, no congestion, no diarrhea, no ear pain, no fatigue, no fever, no myalgias, no nausea, no rash, no rhinorrhea, no shortness of breath, no sore throat, no vomiting and no wheezing        Prior to Admission Medications   Prescriptions Last Dose Informant Patient Reported? Taking?    LORazepam (ATIVAN) 0.5 mg tablet   Yes Yes   QUEtiapine (SEROquel XR) 200 mg 24 hr tablet   Yes No   Sig: Take 200 mg by mouth daily at bedtime   QUEtiapine (SEROquel XR) 400 mg 24 hr tablet   Yes Yes   Sig: Take 400 mg by mouth daily at bedtime   chlorhexidine (PERIDEX) 0.12 % solution   Yes No   Sig: RINSE AND SPIT FOR THIRTY SECONDS TWICE DAILY FOR 2 WEEKS   Patient not taking: Reported on 9/7/2023   clonazePAM (KlonoPIN) 1 mg tablet   Yes Yes   Sig: Take 1 mg by mouth daily at bedtime   dicyclomine (BENTYL) 20 mg tablet   No Yes   Sig: Take 1 tablet (20 mg total) by mouth every 6 (six) hours as needed (diarrhea/crampy abdominal pain)   fluvoxaMINE (LUVOX) 100 mg tablet   Yes Yes   Sig: TAKE 1 AND A HALF TABLETS BY MOUTH TWICE DAILY   ketorolac (TORADOL) 10 mg tablet   No No   Sig: Take 1 tablet (10 mg total) by mouth every 6 (six) hours as needed for moderate pain for up to 8 doses   lithium 600 MG capsule   Yes Yes   Sig: Take 600 mg by mouth 3 (three) times a day   meloxicam (Mobic) 15 mg tablet   No No   Sig: Take 1 tablet (15 mg total) by mouth daily   pantoprazole (PROTONIX) 20 mg tablet   No Yes   Sig: Take 1 tablet (20 mg total) by mouth daily for 4 doses   pantoprazole (PROTONIX) 40 mg tablet   Yes Yes   Sig: Take 40 mg by mouth daily   promethazine (PHENERGAN) 25 mg tablet   No No   Sig: Take 1 tablet (25 mg total) by mouth every 6 (six) hours as needed for nausea or vomiting   triamcinolone (KENALOG) 0.1 % oral topical paste   Yes No   Sig: APPLY THIN FILM ON AFFECTED AREA 3-4 TIMES A DAY AFTER EATING AND AT BEDTIME   Patient not taking: Reported on 9/7/2023      Facility-Administered Medications: None       Past Medical History:   Diagnosis Date   • Anxiety    • Bipolar 1 disorder ([address])        Past Surgical History:   Procedure Laterality Date   • CHOLECYSTECTOMY     • GASTROPLASTY DUODENAL SWITCH         History reviewed. No pertinent family history. I have reviewed and agree with the history as documented. E-Cigarette/Vaping   • E-Cigarette Use Never User      E-Cigarette/Vaping Substances   • Nicotine No    • THC No    • CBD No    • Flavoring No      Social History     Tobacco Use   • Smoking status: Some Days     Types: Cigarettes   • Smokeless tobacco: Never   Vaping Use   • Vaping Use: Never used   Substance Use Topics   • Alcohol use: Not Currently     Comment: occasional   • Drug use: Not Currently       Review of Systems   Constitutional: Negative for activity change, appetite change, fatigue and fever. HENT: Negative for congestion, dental problem, ear pain, rhinorrhea and sore throat. Eyes: Positive for visual disturbance. Negative for pain and redness. Respiratory: Negative for chest tightness, shortness of breath and wheezing. Cardiovascular: Negative for chest pain and palpitations.    Gastrointestinal: Negative for abdominal pain, blood in stool, constipation, diarrhea, nausea and vomiting. Endocrine: Negative for cold intolerance and heat intolerance. Genitourinary: Negative for dysuria, frequency and hematuria. Musculoskeletal: Negative for arthralgias and myalgias. Skin: Negative for color change, pallor and rash. Neurological: Positive for tremors and light-headedness. Negative for weakness and numbness. Hematological: Does not bruise/bleed easily. Psychiatric/Behavioral: Negative for agitation, hallucinations and suicidal ideas. Physical Exam  Physical Exam  Vitals and nursing note reviewed. Constitutional:       Appearance: Normal appearance. He is well-developed. HENT:      Mouth/Throat:      Pharynx: No oropharyngeal exudate. Eyes:      Conjunctiva/sclera: Conjunctivae normal.   Neck:      Comments: No meningeal signs  Cardiovascular:      Rate and Rhythm: Normal rate and regular rhythm. Heart sounds: Normal heart sounds. Pulmonary:      Effort: Pulmonary effort is normal. No respiratory distress. Breath sounds: Normal breath sounds. No wheezing or rales. Chest:      Chest wall: No tenderness. Abdominal:      General: Bowel sounds are normal. There is no distension. Palpations: Abdomen is soft. There is no mass. Tenderness: There is no abdominal tenderness. Hernia: No hernia is present. Comments: No cvat   Musculoskeletal:         General: Normal range of motion. Cervical back: Normal range of motion and neck supple. Lymphadenopathy:      Cervical: No cervical adenopathy. Skin:     Findings: No erythema or rash. Comments: No edema   Neurological:      General: No focal deficit present. Mental Status: He is alert and oriented to person, place, and time. Cranial Nerves: No cranial nerve deficit. Sensory: No sensory deficit. Motor: No weakness.       Coordination: Coordination normal.      Gait: Gait normal.   Psychiatric:         Behavior: Behavior normal.         Vital Signs  ED Triage Vitals [09/15/23 2025]   Temperature Pulse Respirations Blood Pressure SpO2   98.3 °F (36.8 °C) 89 18 130/70 98 %      Temp Source Heart Rate Source Patient Position - Orthostatic VS BP Location FiO2 (%)   Oral Monitor Sitting Right arm --      Pain Score       No Pain           Vitals:    09/15/23 2315 09/16/23 0036 09/16/23 0745 09/16/23 0839   BP: 100/71 112/74 97/59 104/58   Pulse: 77 77 66 74   Patient Position - Orthostatic VS: Lying Lying Lying Lying         Visual Acuity      ED Medications  Medications   sodium chloride 0.9 % bolus 1,000 mL (0 mL Intravenous Stopped 9/15/23 2200)   sodium chloride 0.9 % bolus 1,000 mL (1,000 mL Intravenous New Bag 9/16/23 0007)   sodium chloride 0.9 % bolus 1,000 mL (1,000 mL Intravenous New Bag 9/16/23 1056)       Diagnostic Studies  Results Reviewed     Procedure Component Value Units Date/Time    T4, free [[id number]]  (Normal) Collected: 09/15/23 2052    Lab Status: Final result Specimen: Blood from Arm, Left Updated: 09/16/23 1349     Free T4 0.65 ng/dL     Narrative:        "Therapeutic range for patients medicated with thyroid disorders: 0.61-1.24 ng/dL."    Urinalysis with microscopic [[id number]] Collected: 09/16/23 0929    Lab Status: Final result Specimen: Urine, Clean Catch Updated: 09/16/23 1027     Color, UA Colorless     Clarity, UA Clear     Specific Gravity, UA 1.003     pH, UA 7.0     Leukocytes, UA Negative     Nitrite, UA Negative     Protein, UA Negative mg/dl      Glucose, UA Negative mg/dl      Ketones, UA Negative mg/dl      Urobilinogen, UA <2.0 mg/dl      Bilirubin, UA Negative     Occult Blood, UA Negative     RBC, UA None Seen /hpf      WBC, UA 1-2 /hpf      Epithelial Cells None Seen /hpf      Bacteria, UA None Seen /hpf     Basic metabolic panel [[id number]]  (Abnormal) Collected: 09/16/23 0352    Lab Status: Final result Specimen: Blood from Hand, Left Updated: 09/16/23 0420     Sodium 137 mmol/L Potassium 3.6 mmol/L      Chloride 107 mmol/L      CO2 26 mmol/L      ANION GAP 4 mmol/L      BUN 5 mg/dL      Creatinine 0.82 mg/dL      Glucose 103 mg/dL      Glucose, Fasting 103 mg/dL      Calcium 9.0 mg/dL      eGFR 125 ml/min/1.73sq m     Narrative:      [facility] guidelines for Chronic Kidney Disease (CKD):   •  Stage 1 with normal or high GFR (GFR > 90 mL/min/1.73 square meters)  •  Stage 2 Mild CKD (GFR = 60-89 mL/min/1.73 square meters)  •  Stage 3A Moderate CKD (GFR = 45-59 mL/min/1.73 square meters)  •  Stage 3B Moderate CKD (GFR = 30-44 mL/min/1.73 square meters)  •  Stage 4 Severe CKD (GFR = 15-29 mL/min/1.73 square meters)  •  Stage 5 End Stage CKD (GFR <15 mL/min/1.73 square meters)  Note: GFR calculation is accurate only with a steady state creatinine    Lithium level [[id number]]  (Normal) Collected: 09/16/23 0352    Lab Status: Final result Specimen: Blood from Hand, Left Updated: 09/16/23 0419     Lithium Lvl 1.2 mmol/L     Fingerstick Glucose (POCT) [[id number]]  (Normal) Collected: 09/16/23 0001    Lab Status: Final result Updated: 09/16/23 0004     POC Glucose 104 mg/dl     TSH, 3rd generation with Free T4 reflex [[id number]]  (Abnormal) Collected: 09/15/23 2052    Lab Status: Final result Specimen: Blood from Arm, Left Updated: 09/16/23 0000     TSH 3RD GENERATON 5.873 uIU/mL     Lithium level [[id number]]  (Abnormal) Collected: 09/15/23 2052    Lab Status: Final result Specimen: Blood from Arm, Left Updated: 09/15/23 2124     Lithium Lvl 1.5 mmol/L     Comprehensive metabolic panel [[id number]]  (Abnormal) Collected: 09/15/23 2052    Lab Status: Final result Specimen: Blood from Arm, Left Updated: 09/15/23 2123     Sodium 135 mmol/L      Potassium 3.5 mmol/L      Chloride 103 mmol/L      CO2 28 mmol/L      ANION GAP 4 mmol/L      BUN 6 mg/dL      Creatinine 0.99 mg/dL      Glucose 85 mg/dL      Calcium 9.6 mg/dL      AST 30 U/L      ALT 49 U/L      Alkaline Phosphatase 163 U/L      Total Protein 7.2 g/dL      Albumin 4.7 g/dL      Total Bilirubin 0.38 mg/dL      eGFR 107 ml/min/1.73sq m     Narrative:      [facility] guidelines for Chronic Kidney Disease (CKD):   •  Stage 1 with normal or high GFR (GFR > 90 mL/min/1.73 square meters)  •  Stage 2 Mild CKD (GFR = 60-89 mL/min/1.73 square meters)  •  Stage 3A Moderate CKD (GFR = 45-59 mL/min/1.73 square meters)  •  Stage 3B Moderate CKD (GFR = 30-44 mL/min/1.73 square meters)  •  Stage 4 Severe CKD (GFR = 15-29 mL/min/1.73 square meters)  •  Stage 5 End Stage CKD (GFR <15 mL/min/1.73 square meters)  Note: GFR calculation is accurate only with a steady state creatinine    CBC and differential [[id number]]  (Abnormal) Collected: 09/15/23 2052    Lab Status: Final result Specimen: Blood from Arm, Left Updated: 09/15/23 2109     WBC 13.14 Thousand/uL      RBC 5.83 Million/uL      Hemoglobin 16.8 g/dL      Hematocrit 51.0 %      MCV 88 fL      MCH 28.8 pg      MCHC 32.9 g/dL      RDW 13.6 %      MPV 10.3 fL      Platelets 156 Thousands/uL      nRBC 0 /100 WBCs      Neutrophils Relative 75 %      Immat GRANS % 1 %      Lymphocytes Relative 15 %      Monocytes Relative 6 %      Eosinophils Relative 2 %      Basophils Relative 1 %      Neutrophils Absolute 10.11 Thousands/µL      Immature Grans Absolute 0.06 Thousand/uL      Lymphocytes Absolute 1.90 Thousands/µL      Monocytes Absolute 0.73 Thousand/µL      Eosinophils Absolute 0.23 Thousand/µL      Basophils Absolute 0.11 Thousands/µL                  No orders to display              Procedures  Procedures         ED Course                                             Medical Decision Making  Multiple complaints with benign exam-we will check labs for electrolyte normality, lithium level to rule out lithium toxicity, treat symptoms reassess. Amount and/or Complexity of Data Reviewed  Labs: ordered.       Risk  Decision regarding hospitalization.           Disposition  Final diagnoses:   Lithium toxicity   Leukocytosis   Anxiety     Time reflects when diagnosis was documented in both MDM as applicable and the Disposition within this note     Time User Action Codes Description Comment    9/15/2023 10:37 PM Hassie Door Add [M58.959O] Lithium toxicity     9/15/2023 10:37 PM Hassie Door Add [P95.348] Leukocytosis     9/15/2023 10:37 PM Hassie Door Add [F41.9] Anxiety       ED Disposition     ED Disposition   Admit    Condition   Stable    Date/Time   Fri Sep 15, 2023 10:12 PM    Comment   --         Follow-up Information    None         Discharge Medication List as of 9/16/2023  1:44 PM      CONTINUE these medications which have NOT CHANGED    Details   clonazePAM (KlonoPIN) 1 mg tablet Take 1 mg by mouth daily at bedtime, Historical Med      dicyclomine (BENTYL) 20 mg tablet Take 1 tablet (20 mg total) by mouth every 6 (six) hours as needed (diarrhea/crampy abdominal pain), Starting Sat 9/9/2023, Normal      fluvoxaMINE (LUVOX) 100 mg tablet TAKE 1 AND A HALF TABLETS BY MOUTH TWICE DAILY, Historical Med      LORazepam (ATIVAN) 0.5 mg tablet Historical Med      !! pantoprazole (PROTONIX) 20 mg tablet Take 1 tablet (20 mg total) by mouth daily for 4 doses, Starting Thu 9/7/2023, Until Sat 9/16/2023, Print      !! pantoprazole (PROTONIX) 40 mg tablet Take 40 mg by mouth daily, Starting Thu 7/13/2023, Historical Med      !! QUEtiapine (SEROquel XR) 400 mg 24 hr tablet Take 400 mg by mouth daily at bedtime, Historical Med      chlorhexidine (PERIDEX) 0.12 % solution RINSE AND SPIT FOR THIRTY SECONDS TWICE DAILY FOR 2 WEEKS, Historical Med      ketorolac (TORADOL) 10 mg tablet Take 1 tablet (10 mg total) by mouth every 6 (six) hours as needed for moderate pain for up to 8 doses, Starting Thu 9/7/2023, Print      meloxicam (Mobic) 15 mg tablet Take 1 tablet (15 mg total) by mouth daily, Starting Thu 9/7/2023, Normal      promethazine (PHENERGAN) 25 mg tablet Take 1 tablet (25 mg total) by mouth every 6 (six) hours as needed for nausea or vomiting, Starting Sat 9/9/2023, Normal      !! QUEtiapine (SEROquel XR) 200 mg 24 hr tablet Take 200 mg by mouth daily at bedtime, Starting Tue 8/22/2023, Historical Med      triamcinolone (KENALOG) 0.1 % oral topical paste APPLY THIN FILM ON AFFECTED AREA 3-4 TIMES A DAY AFTER EATING AND AT BEDTIME, Historical Med       !! - Potential duplicate medications found. Please discuss with provider. STOP taking these medications       lithium 600 MG capsule Comments:   Reason for Stopping:               No discharge procedures on file.     PDMP Review     None          ED Provider  Electronically Signed by           Erika Dee MD  09/15/23 [address], [place]  09/21/23 2654

## 2023-09-16 NOTE — UTILIZATION REVIEW
Initial Clinical Review    Admission: Date/Time/Statement:   Admission Orders (From admission, onward)     Ordered        09/15/23 2250  Place in Observation  Once                      Orders Placed This Encounter   Procedures   • Place in Observation     Standing Status:   Standing     Number of Occurrences:   1     Order Specific Question:   Level of Care     Answer:   Med Surg [16]     ED Arrival Information     Expected   -    Arrival   9/15/2023 20:08    Acuity   Urgent            Means of arrival   Walk-In    Escorted by   Self    Service   Hospitalist    Admission type   Emergency            Arrival complaint   Known medical problems           Chief Complaint   Patient presents with   • Medical Problem     States had appointment with psychiatrist and was told to come in to have lithium level checked because she is concerned about lithium toxicity. Complaining of shaking, blurred vision, and balance issues. Initial Presentation: 26 y.o. male who presented self from home to [facility] ED. Admitted in observation status for evaluation and treatment of lithium toxicity. PMHx: anxiety and bipolar 1 disorder. Presented w/ tremors, imbalance, intermittent blurred vision. Unintentional lithium toxicity in setting of recent GI illness with vomiting and diarrhea associated w/ starting Ozempic. Lithium level 1.5. On exam, anxious. Plan: hold Lithium, check EKG, TSH, UA; IVF, I&O, Trend labs & lithium level q6h, replete electrolytes as needed; continue PPI, continue other PTA meds. Psychiatry consulted.       ED Triage Vitals [09/15/23 2025]   Temperature Pulse Respirations Blood Pressure SpO2   98.3 °F (36.8 °C) 89 18 130/70 98 %      Temp Source Heart Rate Source Patient Position - Orthostatic VS BP Location FiO2 (%)   Oral Monitor Sitting Right arm --      Pain Score       No Pain          Wt Readings from Last 1 Encounters:   09/16/23 88.8 kg (195 lb 12.3 oz)     Additional Vital Signs:   Date/Time Temp Pulse Resp BP MAP (mmHg) SpO2 O2 Device   09/16/23 0745 97.5 °F (36.4 °C) 66 18 97/59 67 97 % None (Room air)   09/16/23 0036 97.8 °F (36.6 °C) 77 18 112/74 80 100 % None (Room air)   09/15/23 2315 -- 77 18 100/71 -- 99 % None (Room air)     Pertinent Labs/Diagnostic Test Results:   No orders to display      Latest Reference Range & Units 09/15/23 20:52 09/16/23 03:52   LITHIUM LEVEL 0.6 - 1.2 mmol/L 1.5 (H) 1.2   (H): Data is abnormally high      Results from last 7 days   Lab Units 09/15/23  2052 09/09/23  1704   WBC Thousand/uL 13.14* 10.81*   HEMOGLOBIN g/dL 16.8 16.5   HEMATOCRIT % 51.0* 50.2*   PLATELETS Thousands/uL 429* 312   NEUTROS ABS Thousands/µL 10.11* 8.56*         Results from last 7 days   Lab Units 09/16/23  0352 09/15/23  2052 09/09/23  1704   SODIUM mmol/L 137 135 133*   POTASSIUM mmol/L 3.6 3.5 3.8   CHLORIDE mmol/L 107 103 102   CO2 mmol/L 26 28 25   ANION GAP mmol/L 4 4 6   BUN mg/dL 5 6 7   CREATININE mg/dL 0.82 0.99 0.88   EGFR ml/min/1.73sq m 125 107 121   CALCIUM mg/dL 9.0 9.6 9.1     Results from last 7 days   Lab Units 09/15/23  2052 09/09/23  1704   AST U/L 30 20   ALT U/L 49 22   ALK PHOS U/L 163* 124*   TOTAL PROTEIN g/dL 7.2 6.7   ALBUMIN g/dL 4.7 4.1   TOTAL BILIRUBIN mg/dL 0.38 0.40     Results from last 7 days   Lab Units 09/16/23  0001   POC GLUCOSE mg/dl 104     Results from last 7 days   Lab Units 09/16/23  0352 09/15/23  2052 09/09/23  1704   GLUCOSE RANDOM mg/dL 103 85 83             No results found for: "BETA-HYDROXYBUTYRATE"                               Results from last 7 days   Lab Units 09/15/23  2052   TSH 3RD GENERATON uIU/mL 5.873*                                         Results from last 7 days   Lab Units 09/09/23  1704   LIPASE u/L 49                                                                   ED Treatment:   Medication Administration from 09/15/2023 2008 to 09/16/2023 0031       Date/Time Order Dose Route Action     09/15/2023 2058 EDT sodium chloride 0.9 % bolus 1,000 mL 1,000 mL Intravenous New Bag     09/16/2023 0007 EDT sodium chloride 0.9 % bolus 1,000 mL 1,000 mL Intravenous New Bag        Past Medical History:   Diagnosis Date   • Anxiety    • Bipolar 1 disorder ([address])      Present on Admission:  • Lithium toxicity  • Major depressive disorder  • Gastroesophageal reflux disease without esophagitis  • Thrombocytosis  • Leukocytosis      Admitting Diagnosis: Anxiety [F41.9]  Leukocytosis [D72.829]  Lithium toxicity [Y38.737V]  Known medical problems [Z78.9]  Age/Sex: 26 y.o. male  Admission Orders:   Regular Diet. I&O. Scheduled Medications:  clonazePAM, 1 mg, Oral, HS  fluvoxaMINE, 150 mg, Oral, BID  pantoprazole, 20 mg, Oral, Early Morning  QUEtiapine, 200 mg, Oral, HS  QUEtiapine, 400 mg, Oral, HS      Continuous IV Infusions:  sodium chloride, 200 mL/hr, Intravenous, Continuous      PRN Meds:  acetaminophen, 975 mg, Oral, Q6H PRN  aluminum-magnesium hydroxide-simethicone, 30 mL, Oral, Q6H PRN  promethazine, 25 mg, Oral, Q6H PRN        IP CONSULT TO PSYCHIATRY    Network Utilization Review Department  ATTENTION: Please call with any questions or concerns to [phone] and carefully listen to the prompts so that you are directed to the right person. All voicemails are confidential.  Katrin Lindsay all requests for admission clinical reviews, approved or denied determinations and any other requests to dedicated fax number below belonging to the campus where the patient is receiving treatment.  List of dedicated fax numbers for the Facilities:  [place] DENIALS (Administrative/Medical Necessity) [phone] [address] [facility] (Maternity/NICU/Pediatrics) [phone]   [address] [place] [address] [address] [place] [address] 1000 [facility] [phone] [address] [phone]   [facility] [address] [place] [address] [address] [address] [facility] [address] [facility] [address] 1300 [facility] [address] [phone]

## 2023-09-16 NOTE — DISCHARGE INSTR - AVS FIRST PAGE
Dear Frankie Lloyd,     It was our pleasure to care for you here at [facility]. It is our hope that we were always able to exceed the expected standards for your care during your stay. You were hospitalized due to need for IVF for lithium toxicity. You were cared for on the 4th floor under the service of Talha Crowley DO with the [facility] who covers for your primary care physician (PCP), No primary care provider on file. , while you were hospitalized. If you have any questions or concerns related to this hospitalization, you may contact us at [phone]. For follow up as well as medication refills, we recommend that you follow up with your primary care physician. A registered nurse will reach out to you by phone within a few days after your discharge to answer any additional questions that you may have after going home. However, at this time we provide for you here, the most important instructions / recommendations at discharge:     Notable Medication Adjustments -   Stop lithium until you discuss your care with your primary psychiatrist  Please review this entire after visit summary as additional general instructions including medication list, appointments, activity, diet, any pertinent wound care, and other additional recommendations from your care team that may be provided for you.       Sincerely,     Talha Crowley DO

## 2023-09-16 NOTE — ASSESSMENT & PLAN NOTE
· Unintentional lithium toxicity in setting of recent GI illness with vomiting and diarrhea  · Lithium level 1.5, mild toxicity  · Reports tremors, gait dysfunction and intermittent blurred vision  · Normal creatinine  · Maintained on Li 600mg qa. m and 1200mg qhs.  Last dose taken this morning; missed last nights dose  · Hold further doses of lithium for now  · Check EKG, TSH, UA  · Provide aggressive IV hydration with 0.9NS to correct hypovolemia and increase renal excretion of lithium   · Monitor I/O to ensure adequate kidney perfusion and appropriate clearance  · Monitor creatinine, Na, and serum lithium concentrations q6h until symptoms resolve  · Prn ativan if seizure  · Psychiatry consult

## 2023-09-16 NOTE — ASSESSMENT & PLAN NOTE
Unintentional  Mild symptomatic toxicity with lithium level 1.5  Symptoms resolved with aggressive IV fluids and correlated with the level less than 1 prior to discharge  Appreciate psychiatry recommendations  Hold lithium on discharge until seen by outpatient psychiatrist

## 2023-09-18 NOTE — UTILIZATION REVIEW
Gogo Olmedo NOTIFICATION OF OBSERVATION ADMISSION   AUTHORIZATION REQUEST   SERVICING FACILITY:   [address] [place] [address]  Tax ID: [id number]  NPI: [id number]   ATTENDING PROVIDER:  Attending Name and NPI#: Royce Ludwinrodolfo [[id number]]  Address: [address]  Phone: [phone]     ADMISSION INFORMATION:  Place of Service: On [address] [place] [address] Code: 22 CPT Code:   Admitting Diagnosis Code/Description:  Anxiety [F41.9]  Leukocytosis [D72.829]  Lithium toxicity [T56.891A]  Known medical problems [Z78.9]  Observation Admission Date/Time:   Discharge Date/Time: 9/16/2023  2:43 PM     UTILIZATION REVIEW CONTACT:  Theodore Love Utilization   Network Utilization Review Department  Phone: [phone]  Fax: [phone]  Email: [email or web address]  Contact for approvals/pending authorizations, clinical reviews, and discharge. PHYSICIAN ADVISORY SERVICES:  Medical Necessity Denial & [employer] Review  Phone: [phone]  Fax: [phone]  Email: [email or web address]

## 2023-09-18 NOTE — UTILIZATION REVIEW
NOTIFICATION OF ADMISSION DISCHARGE   This is a Notification of Discharge from [facility]. Please be advised that this patient has been discharge from our facility. Below you will find the admission and discharge date and time including the patient’s disposition. UTILIZATION REVIEW CONTACT:  Emperatriz Lanier  Utilization   Network Utilization Review Department  Phone: [phone] x carefully listen to the prompts. All voicemails are confidential.  Email: [email or web address]     ADMISSION INFORMATION  PRESENTATION DATE: 9/15/2023  8:26 PM  OBERVATION ADMISSION DATE:   INPATIENT ADMISSION DATE: N/A N/A   DISCHARGE DATE: 9/16/2023  2:43 PM   DISPOSITION:Home/Self Care    IMPORTANT INFORMATION:  Send all requests for admission clinical reviews, approved or denied determinations and any other requests to dedicated fax number below belonging to the campus where the patient is receiving treatment.  List of dedicated fax numbers:  [place] [facility] (Administrative/Medical Necessity) [phone] [address] [facility] (Maternity/NICU/Pediatrics) [phone]   [facility] [phone]   [facility] [phone] [facility] [phone]   [address] [place] [address] [phone]   [facility] [phone]   [address] [place] [address] [facility] [phone]   [address] [facility] [phone] 3[address] [facility] [phone] 27[address] [facility] 3000 32[facility] [phone]

## 2024-03-24 ENCOUNTER — HOSPITAL ENCOUNTER (EMERGENCY)
Facility: HOSPITAL | Age: 24
Discharge: HOME | End: 2024-03-25
Attending: EMERGENCY MEDICINE
Payer: COMMERCIAL

## 2024-03-24 VITALS
RESPIRATION RATE: 18 BRPM | DIASTOLIC BLOOD PRESSURE: 64 MMHG | HEART RATE: 72 BPM | TEMPERATURE: 98.3 F | SYSTOLIC BLOOD PRESSURE: 114 MMHG | OXYGEN SATURATION: 94 %

## 2024-03-24 DIAGNOSIS — S61.219A FINGER LACERATION: ICD-10-CM
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
## 2024-03-24 PROCEDURE — 99283 EMERGENCY DEPT VISIT LOW MDM: CPT

## 2024-03-25 ENCOUNTER — APPOINTMENT (EMERGENCY)
Dept: RADIOLOGY | Facility: HOSPITAL | Age: 24
End: 2024-03-25
Payer: COMMERCIAL

## 2024-03-25 PROCEDURE — 73130 X-RAY EXAM OF HAND: CPT

## 2024-03-25 PROCEDURE — 99284 EMERGENCY DEPT VISIT MOD MDM: CPT

## 2024-03-25 PROCEDURE — 12001 RPR S/N/AX/GEN/TRNK 2.5CM/<: CPT

## 2024-03-25 PROCEDURE — 96372 THER/PROPH/DIAG INJ SC/IM: CPT

## 2024-03-25 RX ORDER — LIDOCAINE HYDROCHLORIDE 10 MG/ML
10 INJECTION, SOLUTION EPIDURAL; INFILTRATION; INTRACAUDAL; PERINEURAL ONCE
Status: COMPLETED | OUTPATIENT
Start: 2024-03-25 | End: 2024-03-25

## 2024-03-25 RX ORDER — NAPROXEN 500 MG/1
500 TABLET ORAL 2 TIMES DAILY WITH MEALS
Qty: 30 TABLET | Refills: 0 | Status: SHIPPED | OUTPATIENT
Start: 2024-03-25

## 2024-03-25 RX ORDER — GINSENG 100 MG
1 CAPSULE ORAL ONCE
Status: COMPLETED | OUTPATIENT
Start: 2024-03-25 | End: 2024-03-25

## 2024-03-25 RX ORDER — AMOXICILLIN AND CLAVULANATE POTASSIUM 875; 125 MG/1; MG/1
1 TABLET, FILM COATED ORAL EVERY 12 HOURS
Qty: 13 TABLET | Refills: 0 | Status: SHIPPED | OUTPATIENT
Start: 2024-03-25 | End: 2024-04-01

## 2024-03-25 RX ORDER — ACETAMINOPHEN 325 MG/1
975 TABLET ORAL ONCE
Status: DISCONTINUED | OUTPATIENT
Start: 2024-03-25 | End: 2024-03-25

## 2024-03-25 RX ORDER — KETOROLAC TROMETHAMINE 30 MG/ML
15 INJECTION, SOLUTION INTRAMUSCULAR; INTRAVENOUS ONCE
Status: COMPLETED | OUTPATIENT
Start: 2024-03-25 | End: 2024-03-25

## 2024-03-25 RX ORDER — AMOXICILLIN AND CLAVULANATE POTASSIUM 875; 125 MG/1; MG/1
1 TABLET, FILM COATED ORAL ONCE
Status: COMPLETED | OUTPATIENT
Start: 2024-03-25 | End: 2024-03-25

## 2024-03-25 RX ADMIN — LIDOCAINE HYDROCHLORIDE 10 ML: 10 INJECTION, SOLUTION EPIDURAL; INFILTRATION; INTRACAUDAL at 00:19

## 2024-03-25 RX ADMIN — KETOROLAC TROMETHAMINE 15 MG: 30 INJECTION, SOLUTION INTRAMUSCULAR; INTRAVENOUS at 00:09

## 2024-03-25 RX ADMIN — BACITRACIN ZINC 1 LARGE APPLICATION: 500 OINTMENT TOPICAL at 00:17

## 2024-03-25 RX ADMIN — AMOXICILLIN AND CLAVULANATE POTASSIUM 1 TABLET: 875; 125 TABLET, COATED ORAL at 00:59

## 2024-03-25 NOTE — ED PROVIDER NOTES
History  Chief Complaint   Patient presents with    Finger Laceration     Pt states was cutting food when knife slipped and accidentally cut finger. Laceration noted to right index finger      The patient is a 24 YOM with history of bipolar 1 disorder who presents to the ED for evaluation of a laceration to his right index finger. Initially patient had stated this happened with a knife, but admits it actually happened when he was reaching in his black mouth monitor lizard's cage to change the water. The lizard got spooked, and bit him. He states it did not let go for several seconds. He has been seen in [facility] in [place] following a bite from the same breed of lizard. At that time, poison control was contacted and reported these are not toxic. Patient confirms this. He reports his tetanus is UTD. He does report some paresthesias to the finger. He otherwise denies fever, chills, other injury.        Prior to Admission Medications   Prescriptions Last Dose Informant Patient Reported? Taking?   LORazepam (ATIVAN) 0.5 mg tablet   Yes No   QUEtiapine (SEROquel XR) 200 mg 24 hr tablet   Yes No   Sig: Take 200 mg by mouth daily at bedtime   QUEtiapine (SEROquel XR) 400 mg 24 hr tablet   Yes No   Sig: Take 400 mg by mouth daily at bedtime   chlorhexidine (PERIDEX) 0.12 % solution   Yes No   Sig: RINSE AND SPIT FOR THIRTY SECONDS TWICE DAILY FOR 2 WEEKS   Patient not taking: Reported on 9/7/2023   clonazePAM (KlonoPIN) 1 mg tablet   Yes No   Sig: Take 1 mg by mouth daily at bedtime   dicyclomine (BENTYL) 20 mg tablet   No No   Sig: Take 1 tablet (20 mg total) by mouth every 6 (six) hours as needed (diarrhea/crampy abdominal pain)   fluvoxaMINE (LUVOX) 100 mg tablet   Yes No   Sig: TAKE 1 AND A HALF TABLETS BY MOUTH TWICE DAILY   ketorolac (TORADOL) 10 mg tablet   No No   Sig: Take 1 tablet (10 mg total) by mouth every 6 (six) hours as needed for moderate pain for up to 8 doses   meloxicam (Mobic) 15 mg tablet   No No   Sig: Take 1  tablet (15 mg total) by mouth daily   pantoprazole (PROTONIX) 20 mg tablet   No No   Sig: Take 1 tablet (20 mg total) by mouth daily for 4 doses   pantoprazole (PROTONIX) 40 mg tablet   Yes No   Sig: Take 40 mg by mouth daily   promethazine (PHENERGAN) 25 mg tablet   No No   Sig: Take 1 tablet (25 mg total) by mouth every 6 (six) hours as needed for nausea or vomiting   triamcinolone (KENALOG) 0.1 % oral topical paste   Yes No   Sig: APPLY THIN FILM ON AFFECTED AREA 3-4 TIMES A DAY AFTER EATING AND AT BEDTIME   Patient not taking: Reported on 9/7/2023      Facility-Administered Medications: None       Past Medical History:   Diagnosis Date    Anxiety     Bipolar 1 disorder (HCC)        Past Surgical History:   Procedure Laterality Date    CHOLECYSTECTOMY      GASTROPLASTY DUODENAL SWITCH         History reviewed. No pertinent family history.  I have reviewed and agree with the history as documented.    E-Cigarette/Vaping    E-Cigarette Use Never User      E-Cigarette/Vaping Substances    Nicotine No     THC No     CBD No     Flavoring No      Social History     Tobacco Use    Smoking status: Some Days     Types: Cigarettes    Smokeless tobacco: Never   Vaping Use    Vaping status: Never Used   Substance Use Topics    Alcohol use: Not Currently     Comment: occasional    Drug use: Not Currently       Review of Systems   Constitutional:  Negative for chills and fever.   Respiratory:  Negative for cough and shortness of breath.    Cardiovascular:  Negative for chest pain and leg swelling.   Musculoskeletal:  Positive for arthralgias. Negative for myalgias.   Skin:  Positive for wound. Negative for rash.   Neurological:  Negative for weakness.       Physical Exam  Physical Exam  Vitals and nursing note reviewed.   Constitutional:       General: He is not in acute distress.     Appearance: He is well-developed. He is not toxic-appearing.   HENT:      Head: Normocephalic and atraumatic.   Eyes:      Conjunctiva/sclera:  Conjunctivae normal.   Cardiovascular:      Rate and Rhythm: Normal rate.      Pulses: Normal pulses.   Pulmonary:      Effort: Pulmonary effort is normal. No respiratory distress.   Abdominal:      General: Abdomen is flat.   Musculoskeletal:         General: No swelling.      Right hand: Laceration and tenderness present. Normal range of motion. Normal strength. Normal sensation. Normal capillary refill. Normal pulse.      Cervical back: Neck supple.      Comments: TTP to right second digit overlying laceration and puncture marks. Swelling to the digit. Capillary refill < 2 seconds. FDS and FDP function intact. Sensation intact. Strength 5/5. No TTP to remainder of hand, wrist. Radial pulse 2+.   Skin:     General: Skin is warm and dry.      Capillary Refill: Capillary refill takes less than 2 seconds.      Comments: 1.5 cm linear laceration to the palmar aspect of the right second finger overlying the PIP. Small puncture marks to the dorsal aspect of the hand. Bleeding controlled. Multiple linear abrasions to the bilateral wrists pt reports are from his cat.    Neurological:      Mental Status: He is alert.   Psychiatric:         Mood and Affect: Mood normal.         Vital Signs  ED Triage Vitals   Temperature Pulse Respirations Blood Pressure SpO2   03/24/24 2330 03/24/24 2330 03/24/24 2330 03/24/24 2330 03/24/24 2330   98.3 °F (36.8 °C) 72 18 114/64 94 %      Temp Source Heart Rate Source Patient Position - Orthostatic VS BP Location FiO2 (%)   03/24/24 2330 03/24/24 2330 03/24/24 2330 03/24/24 2330 --   Oral Monitor Lying Right arm       Pain Score       03/25/24 0009       8           Vitals:    03/24/24 2330   BP: 114/64   Pulse: 72   Patient Position - Orthostatic VS: Lying         Visual Acuity      ED Medications  Medications   lidocaine (PF) (XYLOCAINE-MPF) 1 % injection 10 mL (10 mL Infiltration Given by Other 3/25/24 0019)   bacitracin topical ointment 1 large application (1 large application Topical  Given 3/25/24 0017)   ketorolac (TORADOL) injection 15 mg (15 mg Intramuscular Given 3/25/24 0009)   amoxicillin-clavulanate (AUGMENTIN) 875-125 mg per tablet 1 tablet (1 tablet Oral Given 3/25/24 0059)       Diagnostic Studies  Results Reviewed       None                   XR hand 3+ views RIGHT   ED Interpretation by Deneen Jensen PA-C (03/25 3289)   No obvious foreign body, fracture or dislocation as interpreted by me                  Procedures  Universal Protocol:  Consent: Verbal consent obtained.  Risks and benefits: risks, benefits and alternatives were discussed  Consent given by: patient  Patient understanding: patient states understanding of the procedure being performed  Patient consent: the patient's understanding of the procedure matches consent given  Patient identity confirmed: verbally with patient and arm band  Laceration repair    Date/Time: 3/25/2024 1:30 AM    Performed by: Deneen Jensen PA-C  Authorized by: Deneen Jensen PA-C  Body area: upper extremity  Location details: right index finger  Laceration length: 1.5 cm  Tendon involvement: none  Nerve involvement: none  Anesthesia: digital block    Anesthesia:  Local Anesthetic: lidocaine 1% without epinephrine  Anesthetic total: 4 mL    Sedation:  Patient sedated: no      Wound Dehiscence:  Superficial Wound Dehiscence: simple closure      Procedure Details:  Preparation: Patient was prepped and draped in the usual sterile fashion.  Irrigation solution: saline  Irrigation method: syringe  Amount of cleaning: standard  Skin closure: Ethilon (6-0)  Number of sutures: 4  Technique: simple  Approximation: close  Approximation difficulty: simple  Dressing: antibiotic ointment, gauze roll and splint (static finger splint)  Patient tolerance: patient tolerated the procedure well with no immediate complications               ED Course         SBIRT 20yo+      Flowsheet Row Most Recent Value   Initial Alcohol Screen:  US AUDIT-C     1. How often do you have a drink containing alcohol? 0 Filed at: 03/24/2024 2332   2. How many drinks containing alcohol do you have on a typical day you are drinking?  0 Filed at: 03/24/2024 2332   3a. Male UNDER 65: How often do you have five or more drinks on one occasion? 0 Filed at: 03/24/2024 2332   Audit-C Score 0 Filed at: 03/24/2024 2332   ROSA: How many times in the past year have you...    Used an illegal drug or used a prescription medication for non-medical reasons? Never Filed at: 03/24/2024 2332          Medical Decision Making  DDx including but not limited to: laceration, deep structure involvement, foreign body, fracture, wound infection.     Will obtain XR to evaluate for foreign body, fracture, dislocation. Will give Augmentin in context of Lizard bite. Pt reports Tdap is UTD.    Laceration repair as above. XR unremarkable.     At the time of discharge, the patient is in no acute distress. I discussed with the patient the diagnosis, treatment plan, follow-up, return precautions, and discharge instructions; they were given the opportunity to ask questions and verbalized understanding.      Problems Addressed:  Finger laceration: acute illness or injury  Lizard bite: acute illness or injury    Amount and/or Complexity of Data Reviewed  External Data Reviewed: labs and notes.  Radiology: ordered and independent interpretation performed. Decision-making details documented in ED Course.    Risk  OTC drugs.  Prescription drug management.             Disposition  Final diagnoses:   Lizard bite   Finger laceration     Time reflects when diagnosis was documented in both MDM as applicable and the Disposition within this note       Time User Action Codes Description Comment    3/25/2024  1:37 AM Deneen Jensen Add [W59.01XA] Lizard bite     3/25/2024  1:40 AM Deneen Jensen Add [S61.219A] Finger laceration           ED Disposition       ED Disposition   Discharge    Condition   Stable     Date/Time   Mon Mar 25, 2024 0137    Comment   Lupillo Palumbo discharge to home/self care.                   Follow-up Information    None         Discharge Medication List as of 3/25/2024  1:40 AM        START taking these medications    Details   amoxicillin-clavulanate (AUGMENTIN) 875-125 mg per tablet Take 1 tablet by mouth every 12 (twelve) hours for 7 days, Starting Mon 3/25/2024, Until Mon 4/1/2024, Normal      naproxen (Naprosyn) 500 mg tablet Take 1 tablet (500 mg total) by mouth 2 (two) times a day with meals, Starting Mon 3/25/2024, Normal           CONTINUE these medications which have NOT CHANGED    Details   chlorhexidine (PERIDEX) 0.12 % solution RINSE AND SPIT FOR THIRTY SECONDS TWICE DAILY FOR 2 WEEKS, Historical Med      clonazePAM (KlonoPIN) 1 mg tablet Take 1 mg by mouth daily at bedtime, Historical Med      dicyclomine (BENTYL) 20 mg tablet Take 1 tablet (20 mg total) by mouth every 6 (six) hours as needed (diarrhea/crampy abdominal pain), Starting Sat 9/9/2023, Normal      fluvoxaMINE (LUVOX) 100 mg tablet TAKE 1 AND A HALF TABLETS BY MOUTH TWICE DAILY, Historical Med      ketorolac (TORADOL) 10 mg tablet Take 1 tablet (10 mg total) by mouth every 6 (six) hours as needed for moderate pain for up to 8 doses, Starting Thu 9/7/2023, Print      LORazepam (ATIVAN) 0.5 mg tablet Historical Med      meloxicam (Mobic) 15 mg tablet Take 1 tablet (15 mg total) by mouth daily, Starting Thu 9/7/2023, Normal      pantoprazole (PROTONIX) 40 mg tablet Take 40 mg by mouth daily, Starting Thu 7/13/2023, Historical Med      promethazine (PHENERGAN) 25 mg tablet Take 1 tablet (25 mg total) by mouth every 6 (six) hours as needed for nausea or vomiting, Starting Sat 9/9/2023, Normal      !! QUEtiapine (SEROquel XR) 200 mg 24 hr tablet Take 200 mg by mouth daily at bedtime, Starting Tue 8/22/2023, Historical Med      !! QUEtiapine (SEROquel XR) 400 mg 24 hr tablet Take 400 mg by mouth daily at bedtime, Historical  Med      triamcinolone (KENALOG) 0.1 % oral topical paste APPLY THIN FILM ON AFFECTED AREA 3-4 TIMES A DAY AFTER EATING AND AT BEDTIME, Historical Med       !! - Potential duplicate medications found. Please discuss with provider.          No discharge procedures on file.    PDMP Review       None            ED Provider  Electronically Signed by             Deneen Jensen PA-C  03/25/24 0358

## 2024-03-25 NOTE — DISCHARGE INSTRUCTIONS
Return to PCP/Urgent Care/ED for suture removal in 7-10 days    Let soap and water run over it gently daily- do not scrub. Change bandages daily.    Take Augmentin as prescribed for 1 week    Take Naprosyn as needed for pain. Do not take on an empty stomach. Do not combine with Motrin, Ibuprofen, or Advil (it is the same class of medication)     Return to ED for signs of infection: purulent drainage (pus), fever, chills, increased redness, increased pain, red streaking up the hand/arm